# Patient Record
Sex: MALE | Race: OTHER
[De-identification: names, ages, dates, MRNs, and addresses within clinical notes are randomized per-mention and may not be internally consistent; named-entity substitution may affect disease eponyms.]

---

## 2017-10-05 ENCOUNTER — HOSPITAL ENCOUNTER (OUTPATIENT)
Dept: HOSPITAL 62 - SC | Age: 47
Discharge: HOME | End: 2017-10-05
Attending: OPHTHALMOLOGY
Payer: COMMERCIAL

## 2017-10-05 DIAGNOSIS — H25.12: ICD-10-CM

## 2017-10-05 DIAGNOSIS — H25.041: Primary | ICD-10-CM

## 2017-10-05 PROCEDURE — 66984 XCAPSL CTRC RMVL W/O ECP: CPT

## 2017-10-05 PROCEDURE — V2632 POST CHMBR INTRAOCULAR LENS: HCPCS

## 2017-10-05 PROCEDURE — 08RJ3JZ REPLACEMENT OF RIGHT LENS WITH SYNTHETIC SUBSTITUTE, PERCUTANEOUS APPROACH: ICD-10-PCS | Performed by: OPHTHALMOLOGY

## 2017-10-05 RX ADMIN — BESIFLOXACIN PRN DROP: 6 SUSPENSION OPHTHALMIC at 07:04

## 2017-10-05 RX ADMIN — CYCLOPENTOLATE HYDROCHLORIDE AND PHENYLEPHRINE HYDROCHLORIDE PRN DROP: 2; 10 SOLUTION/ DROPS OPHTHALMIC at 07:04

## 2017-10-05 RX ADMIN — TROPICAMIDE PRN DROP: 10 SOLUTION/ DROPS OPHTHALMIC at 07:14

## 2017-10-05 RX ADMIN — TETRACAINE HYDROCHLORIDE PRN DROP: 5 SOLUTION OPHTHALMIC at 06:55

## 2017-10-05 RX ADMIN — TETRACAINE HYDROCHLORIDE PRN DROP: 5 SOLUTION OPHTHALMIC at 07:40

## 2017-10-05 RX ADMIN — TROPICAMIDE PRN DROP: 10 SOLUTION/ DROPS OPHTHALMIC at 07:04

## 2017-10-05 RX ADMIN — BESIFLOXACIN PRN DROP: 6 SUSPENSION OPHTHALMIC at 08:03

## 2017-10-05 RX ADMIN — BESIFLOXACIN PRN DROP: 6 SUSPENSION OPHTHALMIC at 06:54

## 2017-10-05 RX ADMIN — CYCLOPENTOLATE HYDROCHLORIDE AND PHENYLEPHRINE HYDROCHLORIDE PRN DROP: 2; 10 SOLUTION/ DROPS OPHTHALMIC at 06:54

## 2017-10-05 RX ADMIN — TROPICAMIDE PRN DROP: 10 SOLUTION/ DROPS OPHTHALMIC at 06:54

## 2017-10-05 RX ADMIN — TETRACAINE HYDROCHLORIDE PRN DROP: 5 SOLUTION OPHTHALMIC at 07:14

## 2017-10-05 RX ADMIN — CYCLOPENTOLATE HYDROCHLORIDE AND PHENYLEPHRINE HYDROCHLORIDE PRN DROP: 2; 10 SOLUTION/ DROPS OPHTHALMIC at 07:14

## 2017-10-05 NOTE — [facility] OPERATIVE REPORT E
[facility] Operative Report



NAME: CASSIDY TATE

                                      MRN: [id number]

                             AGE: 47Y

DATE OF SURGERY: 10/05/2017           ROOM:



PREOPERATIVE DIAGNOSIS:

CATARACT, RIGHT EYE.



POSTOPERATIVE DIAGNOSIS:

CATARACT, RIGHT EYE.



OPERATION:

Cataract extraction with intraocular lens implant of the right eye.



SURGEON:

RAHUL LIEBERMAN M.D.



ANESTHESIA:

Topical.



PROCEDURE:

After obtaining appropriate consent, the patient's right eye was prepped

and draped in sterile fashion as well as the surgeon in a sterile manner

and cataract surgery was started.  First a paracentesis blade was used to

make a small side-port incision.  Viscoelastic was used to inflate the

anterior chamber.  Next a 2.4 mm incision was made with the paracentesis

blade.  A continuous capsulorrhexis incision was made using a cystotome and

Utrata forceps.  Following this hydrodissection was carried out to make the

lens fully loose and mobile and it was rotated 90 degrees.  Following this,

a divide-and-conquer technique was used to phacoemulsify the lens with a

CDE of 8.77. The remaining cortex was removed with irrigation/aspiration. 

Provisc was instilled into the capsular bag to inflate the bag.  A SN60WF,

18.5 diopter lens was placed.  The remaining viscoelastic material was

removed with irrigation/aspiration.  Following this, a 10-0 nylon suture

was used to close the incision and it was found to be watertight.  Vigamox

was instilled in the eye and a protective shield was placed over the eye. 

The patient returned to the postoperative recovery in stable condition.









DICTATING PHYSICIAN: RAHUL LIEBERMAN M.D.



[id number]              DT: 10/05/2017 2025

PHY#: 2011         DD: 10/05/2017 2015

ID:   [id number]               JOB#: [id number]       ACCT: [id number]



cc:RAHUL LIEBERMAN M.D.

>
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
## 2020-09-11 ENCOUNTER — HOSPITAL ENCOUNTER (INPATIENT)
Dept: HOSPITAL 62 - ER | Age: 50
LOS: 8 days | DRG: 208 | End: 2020-09-19
Attending: HOSPITALIST | Admitting: HOSPITALIST
Payer: COMMERCIAL

## 2020-09-11 DIAGNOSIS — J12.89: ICD-10-CM

## 2020-09-11 DIAGNOSIS — R65.20: ICD-10-CM

## 2020-09-11 DIAGNOSIS — N17.0: ICD-10-CM

## 2020-09-11 DIAGNOSIS — E66.9: ICD-10-CM

## 2020-09-11 DIAGNOSIS — U07.1: Primary | ICD-10-CM

## 2020-09-11 DIAGNOSIS — J96.01: ICD-10-CM

## 2020-09-11 DIAGNOSIS — A41.9: ICD-10-CM

## 2020-09-11 DIAGNOSIS — I46.9: ICD-10-CM

## 2020-09-11 DIAGNOSIS — R74.0: ICD-10-CM

## 2020-09-11 DIAGNOSIS — E87.5: ICD-10-CM

## 2020-09-11 LAB
ADD MANUAL DIFF: NO
ALBUMIN SERPL-MCNC: 2.9 G/DL (ref 3.5–5)
ALP SERPL-CCNC: 369 U/L (ref 38–126)
ANION GAP SERPL CALC-SCNC: 8 MMOL/L (ref 5–19)
APAP SERPL-MCNC: < 10 UG/ML (ref 10–30)
AST SERPL-CCNC: 416 U/L (ref 17–59)
BASE EXCESS BLDV CALC-SCNC: 2.7 MMOL/L
BASOPHILS # BLD AUTO: 0 10^3/UL (ref 0–0.2)
BASOPHILS NFR BLD AUTO: 0.1 % (ref 0–2)
BILIRUB DIRECT SERPL-MCNC: 1.8 MG/DL (ref 0–0.4)
BILIRUB SERPL-MCNC: 2.4 MG/DL (ref 0.2–1.3)
BUN SERPL-MCNC: 9 MG/DL (ref 7–20)
CALCIUM: 7.9 MG/DL (ref 8.4–10.2)
CHLORIDE SERPL-SCNC: 102 MMOL/L (ref 98–107)
CK MB SERPL-MCNC: 1.07 NG/ML (ref ?–4.55)
CK SERPL-CCNC: 165 U/L (ref 55–170)
CO2 SERPL-SCNC: 26 MMOL/L (ref 22–30)
EOSINOPHIL # BLD AUTO: 0 10^3/UL (ref 0–0.6)
EOSINOPHIL NFR BLD AUTO: 0 % (ref 0–6)
ERYTHROCYTE [DISTWIDTH] IN BLOOD BY AUTOMATED COUNT: 14.6 % (ref 11.5–14)
FERRITIN SERPL-MCNC: 1600 NG/ML (ref 17.9–464)
GLUCOSE SERPL-MCNC: 125 MG/DL (ref 75–110)
HCO3 BLDV-SCNC: 28.4 MMOL/L (ref 20–32)
HCT VFR BLD CALC: 43.2 % (ref 37.9–51)
HGB BLD-MCNC: 14.9 G/DL (ref 13.5–17)
LYMPHOCYTES # BLD AUTO: 0.9 10^3/UL (ref 0.5–4.7)
LYMPHOCYTES NFR BLD AUTO: 7.7 % (ref 13–45)
MCH RBC QN AUTO: 34.4 PG (ref 27–33.4)
MCHC RBC AUTO-ENTMCNC: 34.4 G/DL (ref 32–36)
MCV RBC AUTO: 100 FL (ref 80–97)
MONOCYTES # BLD AUTO: 0.3 10^3/UL (ref 0.1–1.4)
MONOCYTES NFR BLD AUTO: 2.5 % (ref 3–13)
NEUTROPHILS # BLD AUTO: 10.5 10^3/UL (ref 1.7–8.2)
NEUTS SEG NFR BLD AUTO: 89.7 % (ref 42–78)
PCO2 BLDV: 47.5 MMHG (ref 35–63)
PH BLDV: 7.4 [PH] (ref 7.3–7.42)
PLATELET # BLD: 102 10^3/UL (ref 150–450)
POTASSIUM SERPL-SCNC: 3.7 MMOL/L (ref 3.6–5)
PROT SERPL-MCNC: 7.7 G/DL (ref 6.3–8.2)
RBC # BLD AUTO: 4.33 10^6/UL (ref 4.35–5.55)
TOTAL CELLS COUNTED % (AUTO): 100 %
TROPONIN I SERPL-MCNC: < 0.012 NG/ML
WBC # BLD AUTO: 11.7 10^3/UL (ref 4–10.5)

## 2020-09-11 PROCEDURE — 82550 ASSAY OF CK (CPK): CPT

## 2020-09-11 PROCEDURE — 82728 ASSAY OF FERRITIN: CPT

## 2020-09-11 PROCEDURE — C9113 INJ PANTOPRAZOLE SODIUM, VIA: HCPCS

## 2020-09-11 PROCEDURE — 96375 TX/PRO/DX INJ NEW DRUG ADDON: CPT

## 2020-09-11 PROCEDURE — 86140 C-REACTIVE PROTEIN: CPT

## 2020-09-11 PROCEDURE — 87340 HEPATITIS B SURFACE AG IA: CPT

## 2020-09-11 PROCEDURE — 83605 ASSAY OF LACTIC ACID: CPT

## 2020-09-11 PROCEDURE — 96365 THER/PROPH/DIAG IV INF INIT: CPT

## 2020-09-11 PROCEDURE — 82947 ASSAY GLUCOSE BLOOD QUANT: CPT

## 2020-09-11 PROCEDURE — 85379 FIBRIN DEGRADATION QUANT: CPT

## 2020-09-11 PROCEDURE — 83735 ASSAY OF MAGNESIUM: CPT

## 2020-09-11 PROCEDURE — 99285 EMERGENCY DEPT VISIT HI MDM: CPT

## 2020-09-11 PROCEDURE — 87070 CULTURE OTHR SPECIMN AEROBIC: CPT

## 2020-09-11 PROCEDURE — 80048 BASIC METABOLIC PNL TOTAL CA: CPT

## 2020-09-11 PROCEDURE — 36556 INSERT NON-TUNNEL CV CATH: CPT

## 2020-09-11 PROCEDURE — 36600 WITHDRAWAL OF ARTERIAL BLOOD: CPT

## 2020-09-11 PROCEDURE — 83615 LACTATE (LD) (LDH) ENZYME: CPT

## 2020-09-11 PROCEDURE — 74018 RADEX ABDOMEN 1 VIEW: CPT

## 2020-09-11 PROCEDURE — 94640 AIRWAY INHALATION TREATMENT: CPT

## 2020-09-11 PROCEDURE — 82330 ASSAY OF CALCIUM: CPT

## 2020-09-11 PROCEDURE — 99291 CRITICAL CARE FIRST HOUR: CPT

## 2020-09-11 PROCEDURE — 87880 STREP A ASSAY W/OPTIC: CPT

## 2020-09-11 PROCEDURE — 82803 BLOOD GASES ANY COMBINATION: CPT

## 2020-09-11 PROCEDURE — 92950 HEART/LUNG RESUSCITATION CPR: CPT

## 2020-09-11 PROCEDURE — 76705 ECHO EXAM OF ABDOMEN: CPT

## 2020-09-11 PROCEDURE — 80053 COMPREHEN METABOLIC PANEL: CPT

## 2020-09-11 PROCEDURE — 96361 HYDRATE IV INFUSION ADD-ON: CPT

## 2020-09-11 PROCEDURE — C9803 HOPD COVID-19 SPEC COLLECT: HCPCS

## 2020-09-11 PROCEDURE — 74177 CT ABD & PELVIS W/CONTRAST: CPT

## 2020-09-11 PROCEDURE — 36415 COLL VENOUS BLD VENIPUNCTURE: CPT

## 2020-09-11 PROCEDURE — 93010 ELECTROCARDIOGRAM REPORT: CPT

## 2020-09-11 PROCEDURE — 71045 X-RAY EXAM CHEST 1 VIEW: CPT

## 2020-09-11 PROCEDURE — 94660 CPAP INITIATION&MGMT: CPT

## 2020-09-11 PROCEDURE — 85025 COMPLETE CBC W/AUTO DIFF WBC: CPT

## 2020-09-11 PROCEDURE — 82962 GLUCOSE BLOOD TEST: CPT

## 2020-09-11 PROCEDURE — 80074 ACUTE HEPATITIS PANEL: CPT

## 2020-09-11 PROCEDURE — 80307 DRUG TEST PRSMV CHEM ANLYZR: CPT

## 2020-09-11 PROCEDURE — 86900 BLOOD TYPING SEROLOGIC ABO: CPT

## 2020-09-11 PROCEDURE — 87635 SARS-COV-2 COVID-19 AMP PRB: CPT

## 2020-09-11 PROCEDURE — 82553 CREATINE MB FRACTION: CPT

## 2020-09-11 PROCEDURE — 71275 CT ANGIOGRAPHY CHEST: CPT

## 2020-09-11 PROCEDURE — 86317 IMMUNOASSAY INFECTIOUS AGENT: CPT

## 2020-09-11 PROCEDURE — 93005 ELECTROCARDIOGRAM TRACING: CPT

## 2020-09-11 PROCEDURE — 31500 INSERT EMERGENCY AIRWAY: CPT

## 2020-09-11 PROCEDURE — P9047 ALBUMIN (HUMAN), 25%, 50ML: HCPCS

## 2020-09-11 PROCEDURE — 94003 VENT MGMT INPAT SUBQ DAY: CPT

## 2020-09-11 PROCEDURE — 86901 BLOOD TYPING SEROLOGIC RH(D): CPT

## 2020-09-11 PROCEDURE — 85652 RBC SED RATE AUTOMATED: CPT

## 2020-09-11 PROCEDURE — 84484 ASSAY OF TROPONIN QUANT: CPT

## 2020-09-11 PROCEDURE — 87040 BLOOD CULTURE FOR BACTERIA: CPT

## 2020-09-11 PROCEDURE — 94002 VENT MGMT INPAT INIT DAY: CPT

## 2020-09-11 PROCEDURE — 36430 TRANSFUSION BLD/BLD COMPNT: CPT

## 2020-09-11 PROCEDURE — 83690 ASSAY OF LIPASE: CPT

## 2020-09-11 PROCEDURE — 84478 ASSAY OF TRIGLYCERIDES: CPT

## 2020-09-11 RX ADMIN — DEXAMETHASONE SODIUM PHOSPHATE SCH MG: 10 INJECTION INTRAMUSCULAR; INTRAVENOUS at 17:00

## 2020-09-11 RX ADMIN — AZITHROMYCIN MONOHYDRATE SCH MLS/HR: 500 INJECTION, POWDER, LYOPHILIZED, FOR SOLUTION INTRAVENOUS at 17:00

## 2020-09-11 RX ADMIN — SODIUM CHLORIDE PRN MLS/HR: 9 INJECTION, SOLUTION INTRAVENOUS at 22:29

## 2020-09-11 NOTE — RADIOLOGY REPORT (SQ)
EXAM DESCRIPTION:  CTA CHEST



IMAGES COMPLETED DATE/TIME:  9/11/2020 12:31 pm



REASON FOR STUDY:  sobr/ddimer elev/bilat infiltrates/covid suspicion



COMPARISON:  None.



TECHNIQUE:  CT scan of the chest performed using helical scanning technique with dynamic intravenous 
contrast injection.  Images reviewed with lung, soft tissue and bone windows.  Reconstructed coronal 
and sagittal MPR images reviewed.

Additional 3 dimensional post-processing performed to develop Maximal Intensity Projection images (MI
P).  All images stored on PACS.

All CT scanners at this facility use dose modulation, iterative reconstruction, and/or weight based d
osing when appropriate to reduce radiation dose to as low as reasonably achievable (ALARA).

[facility]: Dose Right  [facility]: CareDose    [facility]: Dose Right    [facility]: Teradose 4D    [facility]: Smart Technologies



CONTRAST TYPE AND DOSE:  100 ml Omnipaque 350

contrast bolus optimized for the pulmonary arteries. Not diagnostic for the aorta.



RENAL FUNCTION:  Creatinine - 0.67

BUN=9



RADIATION DOSE:  CT Rad equipment meets quality standard of care and radiation dose reduction techniq
ues were employed. CTDIvol: NaN - NaN mGy. DLP: 0 mGy-cm. .



LIMITATIONS:  None.



FINDINGS:  LUNGS AND PLEURA:  Bilateral fairly multifocal extensive peripheral and parenchymal ground
-glass opacities/consolidation and areas of consolidation in the lower lobes bilaterally with associa
ketan air bronchograms.  No pneumothorax.  Very small left pleural effusion.  The central airways are c
lear.

AORTA AND GREAT VESSELS:  Great vessels are patent.  No aneurysm.  Contrast bolus not optimized for t
he aorta.

HEART: No pericardial effusion. No significant coronary artery calcifications.

PULMONARY ARTERIES: No emboli visualized in the main pulmonary arteries or the segmental branches.

HILAR AND MEDIASTINAL STRUCTURES:  Prominent mediastinal and right hilar adenopathy, one of the large
st lymph nodes is noted in the right paratracheal region measures 2.7 in AP diameter.

HARDWARE: None in the chest.

UPPER ABDOMEN:  Please see CT abdomen report.

THYROID AND OTHER SOFT TISSUES:  The visualized thyroid gland is heterogenous in appearance with smal
l nodules present.

BONES: No acute or significant finding.

3D MIPS: Confirm above findings.

OTHER: No other significant finding.



IMPRESSION:  1.  No evidence for acute pulmonary emboli.

2.  Extensive bilateral multifocal peripheral and parenchymal ground-glass opacities/consolidation an
d areas of consolidation in the lower lobes bilaterally.  Commonly reported imaging features of COVID
-19 pneumonia are present.

3.  Mediastinal and right hilar lymphadenopathy.

3.  Additional findings as above.



COMMENT:  1.  The results of this examination were discussed with the emergency department provider o
n 9/11/2020 at 12:46 hours.

Quality ID # 436: Final reports with documentation of one or more dose reduction techniques (e.g., Au
tomated exposure control, adjustment of the mA and/or kV according to patient size, use of iterative 
reconstruction technique)



TECHNICAL DOCUMENTATION:  JOB ID:  [id number]

 2011 [employer]- All Rights Reserved



Reading location - IP/workstation name: [email or web address]

## 2020-09-11 NOTE — ER DOCUMENT REPORT
Entered by WAYNE SHORE SCRIBE  09/11/20 0908 





Acting as scribe for:MARIELLA GRIFFITHS MD





ED Respiratory Problem





- General


Chief Complaint: Breathing Difficulty


Stated Complaint: CHEST PAIN/DIFFICULTY BREATHING


Mode of Arrival: Wheelchair


Information source: Patient


Notes: 





This 50 year old male patient with no significant past medical history presents 

to the ED today with complaints of worsening shortness of breath and 

nonproductive cough that started x3 days ago. Patient states that he he noticed 

blood along with mucus out of his right nare after blowing his nose and that he 

is unsure of whether or not he had a fever. He also notes abdominal pain due to 

coughing. Denies any chest pain, headache, or sick contacts. He mentions that he

works alone at a [employer]. He does not take any medications or have any 

allergies.





TRAVEL OUTSIDE OF THE [place] IN LAST 30 DAYS: Yes





- Related Data


Allergies/Adverse Reactions: 


                                        





No Known Allergies Allergy (Verified 10/05/17 06:50)


   











Past Medical History





- General


Information source: Patient





- Social History


Smoking Status: Never Smoker


Cigarette use (# per day): No


Chew tobacco use (# tins/day): No


Smoking Education Provided: No


Frequency of alcohol use: None


Drug Abuse: None


Family History: Reviewed & Not Pertinent


Patient has suicidal ideation: No


Patient has homicidal ideation: No





- Medical History


Medical History: Negative


Surgical Hx: Negative





Review of Systems





- Review of Systems


Constitutional: See HPI


EENT: See HPI, Nose discharge


Cardiovascular: See HPI.  denies: Chest pain


Respiratory: See HPI, Cough, Short of breath.  denies: Sputum


Gastrointestinal: See HPI, Abdominal pain


Genitourinary: No symptoms reported


Male Genitourinary: No symptoms reported


Musculoskeletal: No symptoms reported


Skin: No symptoms reported


Hematologic/Lymphatic: No symptoms reported


Neurological/Psychological: See HPI.  denies: Headaches


-: Yes All other systems reviewed and negative





Physical Exam





- Vital signs


Vitals: 


                                        











Temp Pulse Resp BP Pulse Ox


 


 99.6 F   98   20   143/74 H  88 L


 


 09/11/20 07:59  09/11/20 07:59  09/11/20 07:59  09/11/20 07:59  09/11/20 07:59














- General


General appearance: Alert


In distress: None





- HEENT


Head: Normocephalic, Atraumatic


Eyes: Normal


Extraocular movements intact: Yes


Pupils: PERRL


Pharynx: Erythema - mild


Neck: Supple





- Respiratory


Chest status: Nontender


Breath sounds: Decreased air movement - Diminished breath sound in bilateral 

bases, Rales - right base


Chest palpation: Normal


Notes: 





When patient was placed in the room, he was tachypneic and hypoxic, 88% on RA. 

Once placed on supplemental oxygen, sats came up to 95%.





- Cardiovascular


Rhythm: Regular.  No: Tachycardia


Heart sounds: Normal auscultation


Murmur: No


Friction rub: No


Gallop: None auscultated





- Abdominal


Inspection: Obese


Distension: No distension


Bowel sounds: Normal


Tenderness: Nontender - Abdomen soft, no point tenderness


Organomegaly: No organomegaly





- Back


Back: Normal, Nontender





- Extremities


General upper extremity: Normal inspection


General lower extremity: Normal inspection.  No: Edema





- Neurological


Neuro grossly intact: Yes


Orientation: AAOx4


[place] Coma Scale Eye Opening: Spontaneous


Kv[place] Coma Scale Verbal: Oriented


[place] Coma Scale Motor: Obeys Commands


[place] Coma Scale Total: 15





- Psychological


Associated symptoms: Normal affect, Normal mood





- Skin


Skin Temperature: Warm


Skin Moisture: Dry


Skin Color: Normal





Course





- Re-evaluation


Re-evalutation: 





09/13/20 07:17


Patient resting comfortably on oxygen to maintain normal O2 sats.





- Vital Signs


Vital signs: 


                                        











Temp Pulse Resp BP Pulse Ox


 


 96.9 F L  62   28 H  138/82 H  97 


 


 09/13/20 05:32  09/13/20 05:32  09/13/20 05:32  09/13/20 05:32  09/13/20 05:32











09/13/20 07:18


Vital signs stable pulse oximetry 97%





- Laboratory


Result Diagrams: 


                                 09/12/20 05:20





                                 09/12/20 05:20


Laboratory results interpreted by me: 


                                        











  09/11/20 09/11/20 09/11/20





  08:23 08:23 08:23


 


WBC  11.7 H  


 


RBC  4.33 L  


 


MCV  100 H  


 


MCH  34.4 H  


 


RDW  14.6 H  


 


Plt Count  102 L  


 


Lymph % (Auto)  7.7 L  


 


Mono % (Auto)  2.5 L  


 


Absolute Neuts (auto)  10.5 H  


 


Seg Neutrophils %  89.7 H  


 


D-Dimer   


 


Sodium   135.5 L 


 


Glucose   125 H 


 


Lactic Acid    2.5 H


 


Calcium   7.9 L 


 


Ferritin   


 


Total Bilirubin   2.4 H 


 


Direct Bilirubin   1.8 H 


 


AST   416 H 


 


ALT   155 H 


 


Alkaline Phosphatase   369 H 


 


Lactate Dehydrogenase   


 


Albumin   2.9 L 


 


Acetaminophen   


 


COVID-19 ([place])   














  09/11/20 09/11/20 09/11/20





  08:23 08:23 08:23


 


WBC   


 


RBC   


 


MCV   


 


MCH   


 


RDW   


 


Plt Count   


 


Lymph % (Auto)   


 


Mono % (Auto)   


 


Absolute Neuts (auto)   


 


Seg Neutrophils %   


 


D-Dimer  1.29 H  


 


Sodium   


 


Glucose   


 


Lactic Acid   


 


Calcium   


 


Ferritin   1600.00 H 


 


Total Bilirubin   


 


Direct Bilirubin   


 


AST   


 


ALT   


 


Alkaline Phosphatase   


 


Lactate Dehydrogenase    408 H


 


Albumin   


 


Acetaminophen   < 10 L 


 


COVID-19 ([place])   














  09/11/20





  09:10


 


WBC 


 


RBC 


 


MCV 


 


MCH 


 


RDW 


 


Plt Count 


 


Lymph % (Auto) 


 


Mono % (Auto) 


 


Absolute Neuts (auto) 


 


Seg Neutrophils % 


 


D-Dimer 


 


Sodium 


 


Glucose 


 


Lactic Acid 


 


Calcium 


 


Ferritin 


 


Total Bilirubin 


 


Direct Bilirubin 


 


AST 


 


ALT 


 


Alkaline Phosphatase 


 


Lactate Dehydrogenase 


 


Albumin 


 


Acetaminophen 


 


COVID-19 ([place])  DETECTED H











09/13/20 07:18


Laboratories disclose mildly elevated white blood cell count 11.7 in an elevated

ferritin elevated LDH lactate dehydrogenase.





- Diagnostic Test


Radiology reviewed: Image reviewed, Reports reviewed


Radiology results interpreted by me: 





                                        





Chest X-Ray  09/11/20 08:31


IMPRESSION:  Multi lobar pneumonia.  Given distribution, recommend consideration

for atypical etiologies in treatment planning.


 








Abdomen Ultrasound  09/11/20 09:16


IMPRESSION:  No evidence of cholelithiasis or cholecystitis.  Nonvisualization 

of the pancreas and abdominal aorta due to obscuration by intervening bowel gas.

 Otherwise unremarkable examination.


 








Chest/Abdomen CTA  09/11/20 11:25


IMPRESSION:  1.  No evidence for acute pulmonary emboli.


2.  Extensive bilateral multifocal peripheral and parenchymal ground-glass 

opacities/consolidation and areas of consolidation in the lower lobes 

bilaterally.  Commonly reported imaging features of COVID-19 pneumonia are 

present.


3.  Mediastinal and right hilar lymphadenopathy.


3.  Additional findings as above.


 








Abdomen/Pelvis CT  09/11/20 11:26


IMPRESSION:  1.  Hepatic steatosis.


2.  Prominence of the gallbladder wall.  No gallstones.


3.  Small uncomplicated inguinal hernias.


 











09/13/20 07:27


Radiology reports show that there is multi lobar pneumonia with groundglass 

opacities and consolidation.  Also no evidence for pulmonary embolus on CT 

angiogram.  Abdomen and pelvis CT did show a prominent gallbladder without any 

other significant findings of infection or inflammation about the biliary tree 

system.  Abdominal ultrasound also disclose no evidence for cholelithiasis or 

cholecystitis.





- EKG Interpretation by Me


Additional EKG results interpreted by me: 





09/13/20 07:28


Twelve-lead EKG shows normal sinus rhythm rate of 94 probable left atrial 

abnormality otherwise normal axis normal intervals IN interval QRS and QT.  No 

signs of acute ST elevations.





Discharge





- Discharge


Clinical Impression: 


 Bilateral pneumonia, Hypoxia, Suspected COVID-19 virus infection





Condition: Critical


Disposition: ADMITTED AS INPATIENT


Admitting Provider: Bessie (Hospitalist)


Unit Admitted: [facility]





I personally performed the services described in the documentation, reviewed and

edited the documentation which was dictated to the scribe in my presence, and it

accurately records my words and actions.

## 2020-09-11 NOTE — RADIOLOGY REPORT (SQ)
EXAM DESCRIPTION:  CT ABD/PELVIS WITH IV ONLY



IMAGES COMPLETED DATE/TIME:  9/11/2020 12:32 pm



REASON FOR STUDY:  elevated LFTs/abd pain



COMPARISON:  None.



TECHNIQUE:  CT scan of the abdomen and pelvis performed using helical scanning technique with dynamic
 intravenous contrast injection.  No oral contrast. Images reviewed with lung, soft tissue, and bone 
windows. Reconstructed coronal and sagittal MPR images reviewed. Delayed images for evaluation of the
 urinary system also acquired. All images stored on PACS.

All CT scanners at this facility use dose modulation, iterative reconstruction, and/or weight based d
osing when appropriate to reduce radiation dose to as low as reasonably achievable (ALARA).

[facility]: Dose Right  [facility]: CareDose    [facility]: Dose Right    [facility]: Teradose 4D    [facility]: [employer]



CONTRAST TYPE AND DOSE:  contrast/concentration: Isovue 350.00 mmol/ml; Total Contrast Delivered: 100
.0 ml; Total Saline Delivered: 90.0 ml



RENAL FUNCTION:  BUN 9 creatinine 0.67



RADIATION DOSE:  .



LIMITATIONS:  None.



FINDINGS:  LOWER CHEST: See separate report of the CT of the chest.

LIVER: The liver is diffusely hypoattenuating.  No hepatic mass is seen.

SPLEEN: Normal size. No focal lesions.

PANCREAS: No masses. No significant calcifications. No adjacent inflammation or peripancreatic fluid 
collections. Pancreatic duct not dilated.

GALLBLADDER: There appears to be mild prominence of the gallbladder wall.  No gallstones are seen.

ADRENAL GLANDS: No significant masses or asymmetry.

RIGHT KIDNEY AND URETER: No solid masses.   No significant calcifications.   No hydronephrosis or hyd
roureter.

LEFT KIDNEY AND URETER: No solid masses.   No significant calcifications.   No hydronephrosis or hydr
oureter.

AORTA AND VESSELS: No aneurysm. No dissection. Renal arteries, SMA, celiac without stenosis.

RETROPERITONEUM: No retroperitoneal adenopathy, hemorrhage or masses.

BOWEL AND PERITONEAL CAVITY: No masses or inflammatory changes. No free fluid or peritoneal masses.

APPENDIX: Normal.

PELVIS: No mass.  No free fluid. Normal bladder.

ABDOMINAL WALL: Bilateral small uncomplicated inguinal hernias are present.

BONES: No significant or acute findings.

OTHER: No other significant finding.



IMPRESSION:  1.  Hepatic steatosis.

2.  Prominence of the gallbladder wall.  No gallstones.

3.  Small uncomplicated inguinal hernias.



TECHNICAL DOCUMENTATION:  JOB ID:  [id number]

Quality ID # 436: Final reports with documentation of one or more dose reduction techniques (e.g., Au
tomated exposure control, adjustment of the mA and/or kV according to patient size, use of iterative 
reconstruction technique)

 2011 [employer]- All Rights Reserved



Reading location - IP/workstation name: [email or web address]

## 2020-09-11 NOTE — RADIOLOGY REPORT (SQ)
EXAM DESCRIPTION:  U/S ABDOMEN LIMITED W/O DOP



IMAGES COMPLETED DATE/TIME:  9/11/2020 11:28 am



REASON FOR STUDY:  elevated LFTs



COMPARISON:  None.



TECHNIQUE:  Dynamic and static grayscale images acquired of the abdomen and recorded on PACS. Additio
nal selected color Doppler and spectral images recorded.



LIMITATIONS:  None.



FINDINGS:  PANCREAS: Largely obscured by intervening bowel gas.

LIVER: No masses. Echotexture normal.

LIVER VASCULATURE: Normal directional flow of the main portal vein and hepatic veins.

GALLBLADDER: No stones. Normal wall thickness. No pericholecystic fluid.

ULTRASOUND-DETECTED GAY'S SIGN: Negative.

INTRAHEPATIC DUCTS AND COMMON DUCT: CBD and intrahepatic ducts normal caliber. No filling defects.

INFERIOR VENA CAVA: Normal flow.

AORTA: Largely obscured by intervening bowel gas.

RIGHT KIDNEY:  Normal size. Normal echogenicity. No solid or suspicious masses. No hydronephrosis. No
 calcifications.

PERITONEAL AND RIGHT PLEURAL SPACE: No ascites or effusions.

OTHER: No other significant findings.



IMPRESSION:  No evidence of cholelithiasis or cholecystitis.  Nonvisualization of the pancreas and ab
dominal aorta due to obscuration by intervening bowel gas.  Otherwise unremarkable examination.



TECHNICAL DOCUMENTATION:  JOB ID:  [id number]

 2011 [employer]- All Rights Reserved



Reading location - IP/workstation name: [email or web address]

## 2020-09-11 NOTE — EKG REPORT
SEVERITY:- BORDERLINE ECG -

SINUS RHYTHM

PROBABLE LEFT ATRIAL ABNORMALITY

:

Confirmed by: Rick Tyler MD 11-Sep-2020 16:55:05

## 2020-09-11 NOTE — PDOC H&P
History of Present Illness


Admission Date/PCP: 


  09/11/20 14:17





  





Patient complains of: 50-year-old patient presents to the ED with complaints of 

worsening shortness of breath, cough associated with blood which started about 3

days ago.


History of Present Illness: 


CASSIDY TATE is a 50 year old male








50-year-old patient presents to the ED with complaints of worsening shortness of

breath, cough associated with blood which started about 3 days ago.  Also 

complains of abdominal pain due to coughing.  He works at a [employer].  Was 

tachypneic and hypoxic initially with a saturation of 88% which improved with 

supplemental oxygen


CT scan of the chest shows extensive bilateral multifocal peripheral and 

parenchymal groundglass opacitiesconsolidation and areas of consolidation in 

the lower lobes bilaterally.  Currently reported imaging features of COVID-19 

pneumonia are present.  There is also mediastinal and right hilar 

lymphadenopathy.  Chest x-ray shows multilobar pneumonia.  CT of the abdomen 

shows hepatic steatosis





Past Medical History


Cardiac Medical History: 


   Denies: Myocardial Infarction, Hypertension


Pulmonary Medical History: 


   Denies: Asthma


Neurological Medical History: 


   Denies: Seizures


GI Medical History: 


   Denies: Hepatitis, Hiatal Hernia


Hematology: 


   Denies: Anemia, Sickle Cell Disease





Past Surgical History


Past Surgical History: 


   Denies: Pacemaker





Social History


Information Source: Patient


Smoking Status: Never Smoker





- Advance Directive


Resuscitation Status: Full Code





Family History


Family History: Reviewed & Not Pertinent


Parental Family History Reviewed: No


Children Family History Reviewed: No


Sibling(s) Family History Reviewed.: No





Medication/Allergy


Allergies/Adverse Reactions: 


                                        





No Known Allergies Allergy (Verified 10/05/17 06:50)


   











Review of Systems


ROS unobtainable: Other - Due to respiratory status





Physical Exam


Vital Signs: 


                                        











Temp Pulse Resp BP Pulse Ox


 


 99.6 F   98   31 H  122/83   94 


 


 09/11/20 07:59  09/11/20 07:59  09/11/20 13:00  09/11/20 12:01  09/11/20 12:01








                                 Intake & Output











 09/10/20 09/11/20 09/12/20





 06:59 06:59 06:59


 


Intake Total   1300


 


Balance   1300


 


Weight   81.193 kg











General appearance: PRESENT: no acute distress, well-developed, well-nourished


Head exam: PRESENT: atraumatic, normocephalic


Eye exam: PRESENT: conjunctiva pink, EOMI, PERRLA.  ABSENT: scleral icterus


Ear exam: PRESENT: normal external ear exam


Mouth exam: PRESENT: moist, tongue midline


Neck exam: ABSENT: carotid bruit, JVD, lymphadenopathy, thyromegaly


Respiratory exam: PRESENT: clear to auscultation craig.  ABSENT: rales, rhonchi, 

wheezes


Cardiovascular exam: PRESENT: RRR.  ABSENT: diastolic murmur, rubs, systolic 

murmur


Pulses: PRESENT: normal dorsalis pedis pul


Vascular exam: PRESENT: normal capillary refill


GI/Abdominal exam: PRESENT: normal bowel sounds, soft.  ABSENT: distended, 

guarding, mass, organolmegaly, rebound, tenderness


Rectal exam: PRESENT: deferred


Extremities exam: PRESENT: full ROM.  ABSENT: calf tenderness, clubbing, pedal 

edema


Neurological exam: PRESENT: alert, awake, oriented to person, oriented to place,

oriented to time, oriented to situation, CN II-XII grossly intact.  ABSENT: 

motor sensory deficit


Psychiatric exam: PRESENT: appropriate affect, normal mood.  ABSENT: homicidal 

ideation, suicidal ideation


Skin exam: PRESENT: dry, intact, warm.  ABSENT: cyanosis, rash





Results


Laboratory Results: 


                                        





                                 09/11/20 08:23 





                                 09/11/20 08:23 





                                        











  09/11/20 09/11/20 09/11/20





  08:23 08:23 08:23


 


WBC  11.7 H  


 


RBC  4.33 L  


 


Hgb  14.9  


 


Hct  43.2  


 


MCV  100 H  


 


MCH  34.4 H  


 


MCHC  34.4  


 


RDW  14.6 H  


 


Plt Count  102 L  


 


Seg Neutrophils %  89.7 H  


 


VBG pH   


 


VBG pCO2   


 


VBG HCO3   


 


VBG Base Excess   


 


Sodium   135.5 L 


 


Potassium   3.7 


 


Chloride   102 


 


Carbon Dioxide   26 


 


Anion Gap   8 


 


BUN   9 


 


Creatinine   0.67 


 


Est GFR ( Amer)   > 60 


 


Glucose   125 H 


 


Lactic Acid    2.5 H


 


Calcium   7.9 L 


 


Ferritin   


 


Total Bilirubin   2.4 H 


 


AST   416 H 


 


Alkaline Phosphatase   369 H 


 


Total Protein   7.7 


 


Albumin   2.9 L 


 


Lipase   














  09/11/20 09/11/20 09/11/20





  08:23 08:23 08:23


 


WBC   


 


RBC   


 


Hgb   


 


Hct   


 


MCV   


 


MCH   


 


MCHC   


 


RDW   


 


Plt Count   


 


Seg Neutrophils %   


 


VBG pH  7.40  


 


VBG pCO2  47.5  


 


VBG HCO3  28.4  


 


VBG Base Excess  2.7  


 


Sodium   


 


Potassium   


 


Chloride   


 


Carbon Dioxide   


 


Anion Gap   


 


BUN   


 


Creatinine   


 


Est GFR (African Amer)   


 


Glucose   


 


Lactic Acid   


 


Calcium   


 


Ferritin   1600.00 H 


 


Total Bilirubin   


 


AST   


 


Alkaline Phosphatase   


 


Total Protein   


 


Albumin   


 


Lipase    196.1








                                        











  09/11/20 09/11/20





  08:23 08:23


 


Creatine Kinase  165 


 


CK-MB (CK-2)   1.07


 


Troponin I   < 0.012











Impressions: 


                                        





Chest X-Ray  09/11/20 08:31


IMPRESSION:  Multi lobar pneumonia.  Given distribution, recommend consideration

for atypical etiologies in treatment planning.


 








Abdomen Ultrasound  09/11/20 09:16


IMPRESSION:  No evidence of cholelithiasis or cholecystitis.  Nonvisualization 

of the pancreas and abdominal aorta due to obscuration by intervening bowel gas.

 Otherwise unremarkable examination.


 








Chest/Abdomen CTA  09/11/20 11:25


IMPRESSION:  1.  No evidence for acute pulmonary emboli.


2.  Extensive bilateral multifocal peripheral and parenchymal ground-glass 

opacities/consolidation and areas of consolidation in the lower lobes 

bilaterally.  Commonly reported imaging features of COVID-19 pneumonia are 

present.


3.  Mediastinal and right hilar lymphadenopathy.


3.  Additional findings as above.


 








Abdomen/Pelvis CT  09/11/20 11:26


IMPRESSION:  1.  Hepatic steatosis.


2.  Prominence of the gallbladder wall.  No gallstones.


3.  Small uncomplicated inguinal hernias.


 














Assessment and Plan





- Diagnosis


(1) Acute respiratory failure due to COVID-19


Is this a current diagnosis for this admission?: Yes   





(2) Transaminitis


Is this a current diagnosis for this admission?: Yes   





(3) Bilateral pneumonia


Is this a current diagnosis for this admission?: Yes   





(4) Hypoxia


Is this a current diagnosis for this admission?: Yes   





(5) Sepsis


Is this a current diagnosis for this admission?: Yes   





- Plan Summary


Summary: 


Patient presents to the hospital with acute respiratory failure.  He was found 

to be hypoxic with oxygen saturation of 88%, tachypneic with his respiratory 

rate as high as 37 as well as a transaminitis and bilateral lung opacities 

consistent with COVID-19.  Patient is septic secondary to COVID-19.  He received

cefepime in the emergency room however patient will be placed on Zithromax, 

dexamethasone, and supplements including zinc and vitamin D3.  I did contact 

pharmacy for initiating from this vi but at this time he has not met the 

hospitals criteria which include a positive COVID test and so we will follow-up 

with this subsequently.  Patient is also noted to have a transaminitis likely 

secondary to COVID





- Time


Time Spent with patient: 35 or more minutes


Medications reviewed and adjusted accordingly: Yes


Anticipated Discharge Disposition: Home, Self Care


Anticipated Discharge Timeframe: within 72 hours





- Inpatient Certification


I certify that my determination is in accordance with my understanding of 

Medicare's requirements for reasonable and necessary INPATIENT services [42 CFR 

412.3e].: Yes


Medical Necessity: Significant Comorbidiites Make Outpatient Treatment Too 

Risky, Need Close Monitoring Due to Risk of Patient Decompensation, Need for IV 

Antibiotics, Risk of Complication if Not Cared For in Hospital

## 2020-09-12 LAB
ADD MANUAL DIFF: NO
ALBUMIN SERPL-MCNC: 2.4 G/DL (ref 3.5–5)
ALP SERPL-CCNC: 330 U/L (ref 38–126)
ANION GAP SERPL CALC-SCNC: 5 MMOL/L (ref 5–19)
ARTERIAL BLOOD FIO2: (no result)
ARTERIAL BLOOD H2CO3: 1.16 MMOL/L (ref 1.05–1.35)
ARTERIAL BLOOD HCO3: 23.1 MMOL/L (ref 20–24)
ARTERIAL BLOOD PCO2: 38.4 MMHG (ref 35–45)
ARTERIAL BLOOD PH: 7.4 (ref 7.35–7.45)
ARTERIAL BLOOD PO2: 62.6 MMHG (ref 80–100)
ARTERIAL BLOOD TOTAL CO2: 24.3 MMOL/L (ref 23–27)
AST SERPL-CCNC: 257 U/L (ref 17–59)
BASE EXCESS BLDA CALC-SCNC: -1.3 MMOL/L
BASOPHILS # BLD AUTO: 0 10^3/UL (ref 0–0.2)
BASOPHILS NFR BLD AUTO: 0.2 % (ref 0–2)
BILIRUB DIRECT SERPL-MCNC: 1 MG/DL (ref 0–0.4)
BILIRUB SERPL-MCNC: 1.6 MG/DL (ref 0.2–1.3)
BUN SERPL-MCNC: 11 MG/DL (ref 7–20)
CALCIUM: 7.4 MG/DL (ref 8.4–10.2)
CHLORIDE SERPL-SCNC: 108 MMOL/L (ref 98–107)
CO2 SERPL-SCNC: 23 MMOL/L (ref 22–30)
EOSINOPHIL # BLD AUTO: 0 10^3/UL (ref 0–0.6)
EOSINOPHIL NFR BLD AUTO: 0 % (ref 0–6)
ERYTHROCYTE [DISTWIDTH] IN BLOOD BY AUTOMATED COUNT: 14.8 % (ref 11.5–14)
GLUCOSE SERPL-MCNC: 132 MG/DL (ref 75–110)
HCT VFR BLD CALC: 41.5 % (ref 37.9–51)
HGB BLD-MCNC: 14.6 G/DL (ref 13.5–17)
LYMPHOCYTES # BLD AUTO: 0.9 10^3/UL (ref 0.5–4.7)
LYMPHOCYTES NFR BLD AUTO: 16.5 % (ref 13–45)
MCH RBC QN AUTO: 34.7 PG (ref 27–33.4)
MCHC RBC AUTO-ENTMCNC: 35.2 G/DL (ref 32–36)
MCV RBC AUTO: 99 FL (ref 80–97)
MONOCYTES # BLD AUTO: 0.2 10^3/UL (ref 0.1–1.4)
MONOCYTES NFR BLD AUTO: 3.6 % (ref 3–13)
NEUTROPHILS # BLD AUTO: 4.4 10^3/UL (ref 1.7–8.2)
NEUTS SEG NFR BLD AUTO: 79.7 % (ref 42–78)
PLATELET # BLD: 92 10^3/UL (ref 150–450)
POTASSIUM SERPL-SCNC: 4.2 MMOL/L (ref 3.6–5)
PROT SERPL-MCNC: 6.7 G/DL (ref 6.3–8.2)
RBC # BLD AUTO: 4.21 10^6/UL (ref 4.35–5.55)
SAO2 % BLDA: 92.1 % (ref 94–98)
TOTAL CELLS COUNTED % (AUTO): 100 %
WBC # BLD AUTO: 5.5 10^3/UL (ref 4–10.5)

## 2020-09-12 RX ADMIN — ASPIRIN SCH: 81 TABLET, COATED ORAL at 21:13

## 2020-09-12 RX ADMIN — AZITHROMYCIN MONOHYDRATE SCH MLS/HR: 500 INJECTION, POWDER, LYOPHILIZED, FOR SOLUTION INTRAVENOUS at 11:46

## 2020-09-12 RX ADMIN — DEXAMETHASONE SODIUM PHOSPHATE SCH MG: 10 INJECTION INTRAMUSCULAR; INTRAVENOUS at 10:13

## 2020-09-12 RX ADMIN — SODIUM CHLORIDE PRN MLS/HR: 9 INJECTION, SOLUTION INTRAVENOUS at 19:40

## 2020-09-12 RX ADMIN — Medication SCH MG: at 10:12

## 2020-09-12 RX ADMIN — ENOXAPARIN SODIUM SCH: 40 INJECTION SUBCUTANEOUS at 21:13

## 2020-09-12 RX ADMIN — PANTOPRAZOLE SODIUM SCH MG: 40 TABLET, DELAYED RELEASE ORAL at 05:31

## 2020-09-12 RX ADMIN — SODIUM CHLORIDE PRN MLS/HR: 9 INJECTION, SOLUTION INTRAVENOUS at 08:53

## 2020-09-12 RX ADMIN — VITAMIN D, TAB 1000IU (100/BT) SCH UNIT: 25 TAB at 10:12

## 2020-09-12 RX ADMIN — ENOXAPARIN SODIUM SCH: 40 INJECTION SUBCUTANEOUS at 10:14

## 2020-09-12 NOTE — PDOC PROGRESS REPORT
Subjective


Progress Note for:: 09/12/20


Subjective:: 





Patient's breathing remains pretty tenuous.  He decompensates with attempts to 

decrease his oxygen.  When I saw him this morning he was on 10 L oxygen with 

facemask.  COVID-19 test is still pending


Reason For Visit: 


ATYPICAL PNEUMONIA, R/O COVID 19,ACUTE RESPIRATORY








Physical Exam


Vital Signs: 


                                        











Temp Pulse Resp BP Pulse Ox


 


 98.4 F   71   27 H  142/92 H  94 


 


 09/12/20 16:40  09/12/20 16:40  09/12/20 16:40  09/12/20 16:40  09/12/20 16:40








                                 Intake & Output











 09/11/20 09/12/20 09/13/20





 06:59 06:59 06:59


 


Intake Total  1800 1510


 


Output Total  1400 360


 


Balance  400 1150


 


Weight  84.6 kg 











General appearance: PRESENT: no acute distress


Head exam: PRESENT: atraumatic, normocephalic


Eye exam: PRESENT: PERRLA.  ABSENT: scleral icterus


Mouth exam: PRESENT: moist, tongue midline


Neck exam: ABSENT: carotid bruit, JVD, lymphadenopathy, thyromegaly


Respiratory exam: PRESENT: decreased breath sounds, rhonchi, tachypnea.  ABSENT:

rales, unlabored, wheezes


Cardiovascular exam: PRESENT: RRR.  ABSENT: diastolic murmur, rubs, systolic 

murmur


Pulses: PRESENT: normal dorsalis pedis pul


Vascular exam: PRESENT: normal capillary refill


GI/Abdominal exam: PRESENT: normal bowel sounds, soft.  ABSENT: distended, gu

arding, mass, organolmegaly, rebound, tenderness


Rectal exam: PRESENT: deferred


Extremities exam: ABSENT: calf tenderness, clubbing, pedal edema


Neurological exam: PRESENT: alert, awake, oriented to person, oriented to place,

oriented to time, oriented to situation, CN II-XII grossly intact.  ABSENT: 

motor sensory deficit


Psychiatric exam: PRESENT: appropriate affect, normal mood.  ABSENT: homicidal 

ideation, suicidal ideation


Skin exam: PRESENT: dry, intact, warm.  ABSENT: cyanosis, rash





Results


Laboratory Results: 


                                        





                                 09/12/20 05:20 





                                 09/12/20 05:20 





                                        











  09/12/20 09/12/20 09/12/20





  05:20 05:20 06:45


 


WBC  5.5  


 


RBC  4.21 L  


 


Hgb  14.6  


 


Hct  41.5  


 


MCV  99 H  


 


MCH  34.7 H  


 


MCHC  35.2  


 


RDW  14.8 H  


 


Plt Count  92 L  


 


Seg Neutrophils %  79.7 H  


 


Carbonic Acid    1.16


 


HCO3/H2CO3 Ratio    19:1


 


ABG pH    7.40


 


ABG pCO2    38.4


 


ABG pO2    62.6 L


 


ABG HCO3    23.1


 


ABG O2 Saturation    92.1 L


 


ABG Base Excess    -1.3


 


FiO2    10L


 


Sodium   135.8 L 


 


Potassium   4.2 


 


Chloride   108 H 


 


Carbon Dioxide   23 


 


Anion Gap   5 


 


BUN   11 


 


Creatinine   0.55 


 


Est GFR ( Amer)   > 60 


 


Glucose   132 H 


 


Calcium   7.4 L 


 


Total Bilirubin   1.6 H 


 


AST   257 H 


 


Alkaline Phosphatase   330 H 


 


Total Protein   6.7 


 


Albumin   2.4 L 








                                        











  09/11/20 09/11/20





  08:23 08:23


 


Creatine Kinase  165 


 


CK-MB (CK-2)   1.07


 


Troponin I   < 0.012











Impressions: 


                                        





Abdomen Ultrasound  09/11/20 09:16


IMPRESSION:  No evidence of cholelithiasis or cholecystitis.  Nonvisualization 

of the pancreas and abdominal aorta due to obscuration by intervening bowel gas.

 Otherwise unremarkable examination.


 








Chest/Abdomen CTA  09/11/20 11:25


IMPRESSION:  1.  No evidence for acute pulmonary emboli.


2.  Extensive bilateral multifocal peripheral and parenchymal ground-glass 

opacities/consolidation and areas of consolidation in the lower lobes 

bilaterally.  Commonly reported imaging features of COVID-19 pneumonia are p

resent.


3.  Mediastinal and right hilar lymphadenopathy.


3.  Additional findings as above.


 








Abdomen/Pelvis CT  09/11/20 11:26


IMPRESSION:  1.  Hepatic steatosis.


2.  Prominence of the gallbladder wall.  No gallstones.


3.  Small uncomplicated inguinal hernias.


 








Chest X-Ray  09/12/20 06:00


IMPRESSION:  No significant change.


 














Assessment and Plan





- Diagnosis


(1) Acute respiratory failure due to COVID-19


Is this a current diagnosis for this admission?: Yes   


Plan: 


COVID test is pending however clinically patient seems to have an acute 

respiratory illness secondary to COVID








(2) Transaminitis


Is this a current diagnosis for this admission?: Yes   


Plan: 


Slightly improved we will continue to monitor








(3) Bilateral pneumonia


Is this a current diagnosis for this admission?: Yes   


Plan: 


CT scan suggest imaging features of COVID-19, mediastinal and right hilar 

lymphadenopathy.  Follow-up chest x-ray today reveals no significant changes








(4) Hypoxia


Is this a current diagnosis for this admission?: Yes   





(5) Sepsis


Is this a current diagnosis for this admission?: Yes   


Plan: 


Follow-up labs are improved








- Plan Summary


Summary: 


Patient presents to the hospital with acute respiratory failure.  He was found 

to be hypoxic with oxygen saturation of 88%, tachypneic with his respiratory 

rate as high as 37 as well as a transaminitis and bilateral lung opacities 

consistent with COVID-19.  Patient is septic secondary to COVID-19.  He received

cefepime in the emergency room however patient will be placed on Zithromax, 

dexamethasone, and supplements including zinc and vitamin D3.  I did contact 

pharmacy for initiating from this vi but at this time he has not met the 

hospitals criteria which include a positive COVID test and so we will follow-up 

with this subsequently.  Patient is also noted to have a transaminitis likely 

secondary to COVID





- Time


Time Spent with patient: 15-24 minutes


Medications reviewed and adjusted accordingly: Yes


Anticipated Discharge Disposition: Home, Self Care


Anticipated Discharge Timeframe: within 72 hours

## 2020-09-12 NOTE — RADIOLOGY REPORT (SQ)
EXAM DESCRIPTION:  CHEST SINGLE VIEW



IMAGES COMPLETED DATE/TIME:  9/12/2020 5:29 am



REASON FOR STUDY:  COVID



COMPARISON:  9/11/2020



NUMBER OF VIEWS:  One view.



TECHNIQUE:  Single frontal radiographic image of the chest acquired.



LIMITATIONS:  Poor inspiratory effort.



FINDINGS:  LUNGS AND PLEURA: Bilateral airspace disease, left greater than right not significantly ch
anged.

MEDIASTINUM AND HEART: Stable heart size and mediastinal structures.

BONY STRUCTURES: No acute findings.

HARDWARE: None.

OTHER: No other significant finding.



IMPRESSION:  No significant change.



TECHNICAL DOCUMENTATION:  JOB ID:  [id number]



Reading location - IP/workstation name: [email or web address]

## 2020-09-13 LAB
ADD MANUAL DIFF: NO
ALBUMIN SERPL-MCNC: 2.6 G/DL (ref 3.5–5)
ALP SERPL-CCNC: 452 U/L (ref 38–126)
ANION GAP SERPL CALC-SCNC: 8 MMOL/L (ref 5–19)
ARTERIAL BLOOD FIO2: (no result)
ARTERIAL BLOOD H2CO3: 1.08 MMOL/L (ref 1.05–1.35)
ARTERIAL BLOOD HCO3: 21.6 MMOL/L (ref 20–24)
ARTERIAL BLOOD PCO2: 35.8 MMHG (ref 35–45)
ARTERIAL BLOOD PH: 7.4 (ref 7.35–7.45)
ARTERIAL BLOOD PO2: 68.6 MMHG (ref 80–100)
ARTERIAL BLOOD TOTAL CO2: 22.7 MMOL/L (ref 23–27)
AST SERPL-CCNC: 241 U/L (ref 17–59)
BASE EXCESS BLDA CALC-SCNC: -2.5 MMOL/L
BASOPHILS # BLD AUTO: 0 10^3/UL (ref 0–0.2)
BASOPHILS NFR BLD AUTO: 0.1 % (ref 0–2)
BILIRUB DIRECT SERPL-MCNC: 1 MG/DL (ref 0–0.4)
BILIRUB SERPL-MCNC: 1.6 MG/DL (ref 0.2–1.3)
BUN SERPL-MCNC: 16 MG/DL (ref 7–20)
CALCIUM: 7.6 MG/DL (ref 8.4–10.2)
CHLORIDE SERPL-SCNC: 110 MMOL/L (ref 98–107)
CO2 SERPL-SCNC: 20 MMOL/L (ref 22–30)
CRP SERPL-MCNC: 64.7 MG/L (ref ?–10)
EOSINOPHIL # BLD AUTO: 0 10^3/UL (ref 0–0.6)
EOSINOPHIL NFR BLD AUTO: 0 % (ref 0–6)
ERYTHROCYTE [DISTWIDTH] IN BLOOD BY AUTOMATED COUNT: 15.1 % (ref 11.5–14)
ERYTHROCYTE [SEDIMENTATION RATE] IN BLOOD: 90 MM/HR (ref 0–15)
GLUCOSE SERPL-MCNC: 171 MG/DL (ref 75–110)
HCT VFR BLD CALC: 44.4 % (ref 37.9–51)
HGB BLD-MCNC: 15.5 G/DL (ref 13.5–17)
LYMPHOCYTES # BLD AUTO: 0.8 10^3/UL (ref 0.5–4.7)
LYMPHOCYTES NFR BLD AUTO: 7.8 % (ref 13–45)
MCH RBC QN AUTO: 34.7 PG (ref 27–33.4)
MCHC RBC AUTO-ENTMCNC: 34.9 G/DL (ref 32–36)
MCV RBC AUTO: 100 FL (ref 80–97)
MONOCYTES # BLD AUTO: 0.6 10^3/UL (ref 0.1–1.4)
MONOCYTES NFR BLD AUTO: 6.1 % (ref 3–13)
NEUTROPHILS # BLD AUTO: 8.7 10^3/UL (ref 1.7–8.2)
NEUTS SEG NFR BLD AUTO: 86 % (ref 42–78)
PLATELET # BLD: 145 10^3/UL (ref 150–450)
POTASSIUM SERPL-SCNC: 4.6 MMOL/L (ref 3.6–5)
PROT SERPL-MCNC: 7.2 G/DL (ref 6.3–8.2)
RBC # BLD AUTO: 4.46 10^6/UL (ref 4.35–5.55)
SAO2 % BLDA: 93.8 % (ref 94–98)
TOTAL CELLS COUNTED % (AUTO): 100 %
WBC # BLD AUTO: 10.1 10^3/UL (ref 4–10.5)

## 2020-09-13 PROCEDURE — XW033E5 INTRODUCTION OF REMDESIVIR ANTI-INFECTIVE INTO PERIPHERAL VEIN, PERCUTANEOUS APPROACH, NEW TECHNOLOGY GROUP 5: ICD-10-PCS | Performed by: INTERNAL MEDICINE

## 2020-09-13 RX ADMIN — Medication SCH MG: at 09:19

## 2020-09-13 RX ADMIN — ENOXAPARIN SODIUM SCH MG: 100 INJECTION SUBCUTANEOUS at 22:55

## 2020-09-13 RX ADMIN — PANTOPRAZOLE SODIUM SCH MG: 40 TABLET, DELAYED RELEASE ORAL at 06:00

## 2020-09-13 RX ADMIN — ASPIRIN SCH MG: 81 TABLET, COATED ORAL at 22:55

## 2020-09-13 RX ADMIN — PIPERACILLIN AND TAZOBACTAM SCH MLS/HR: 3; .375 INJECTION, POWDER, LYOPHILIZED, FOR SOLUTION INTRAVENOUS; PARENTERAL at 14:55

## 2020-09-13 RX ADMIN — SODIUM CHLORIDE PRN MLS/HR: 9 INJECTION, SOLUTION INTRAVENOUS at 11:52

## 2020-09-13 RX ADMIN — DEXAMETHASONE SODIUM PHOSPHATE SCH MG: 10 INJECTION INTRAMUSCULAR; INTRAVENOUS at 09:20

## 2020-09-13 RX ADMIN — PIPERACILLIN AND TAZOBACTAM SCH MLS/HR: 3; .375 INJECTION, POWDER, LYOPHILIZED, FOR SOLUTION INTRAVENOUS; PARENTERAL at 22:55

## 2020-09-13 RX ADMIN — VITAMIN D, TAB 1000IU (100/BT) SCH UNIT: 25 TAB at 09:19

## 2020-09-13 RX ADMIN — AZITHROMYCIN MONOHYDRATE SCH MLS/HR: 500 INJECTION, POWDER, LYOPHILIZED, FOR SOLUTION INTRAVENOUS at 11:06

## 2020-09-13 RX ADMIN — ENOXAPARIN SODIUM SCH: 100 INJECTION SUBCUTANEOUS at 10:59

## 2020-09-13 NOTE — PDOC PROGRESS REPORT
Subjective


Progress Note for:: 09/13/20


Subjective:: 





 50 year old male








50-year-old patient presents to the ED with complaints of worsening shortness of

breath, cough associated with blood which started about 3 days ago.  Also 

complains of abdominal pain due to coughing.  He works at a [employer].  Was 

tachypneic and hypoxic initially with a saturation of 88% which improved with 

supplemental oxygen


CT scan of the chest shows extensive bilateral multifocal peripheral and 

parenchymal groundglass opacitiesconsolidation and areas of consolidation in 

the lower lobes bilaterally.  Currently reported imaging features of COVID-19 

pneumonia are present.  There is also mediastinal and right hilar 

lymphadenopathy.  Chest x-ray shows multilobar pneumonia.  CT of the abdomen 

shows hepatic steatosis








: 09/12/20


Subjective:: 





Patient's breathing remains pretty tenuous.  He decompensates with attempts to 

decrease his oxygen.  When I saw him this morning he was on 10 L oxygen with 

facemask.  COVID-19 test is still pending





9/13/2020-patient is on 100% CPAP at this time.  Desaturating without CPAP 

support.  covid  test came back positive.  Discussed case with the pharmacist to

start on remdesivir.. Patient is weaned to room #305.  To continue IV 

dexamethasone 6 mg IV daily.  Started on a treatment dose of Lovenox.  CRP, ESR,

d-dimer is pending at this time.  Condition is critical.  Started on IV Zosyn 

this morning.  Patient is already on Zithromax.


Reason For Visit: 


ATYPICAL PNEUMONIA, R/O COVID 19,ACUTE RESPIRATORY








Physical Exam


Vital Signs: 


                                        











Temp Pulse Resp BP Pulse Ox


 


 96.9 F L  66   27 H  138/82 H  98 


 


 09/13/20 05:32  09/13/20 07:00  09/13/20 08:04  09/13/20 05:32  09/13/20 08:04








                                 Intake & Output











 09/12/20 09/13/20 09/14/20





 06:59 06:59 06:59


 


Intake Total 1800 2710 


 


Output Total 1400 2310 


 


Balance 400 400 


 


Weight 84.6 kg 84.9 kg 











General appearance: PRESENT: obese, other - In moderate distress.


Head exam: PRESENT: atraumatic


Eye exam: PRESENT: PERRLA


Ear exam: PRESENT: normal external ear exam


Mouth exam: PRESENT: neck supple


Teeth exam: PRESENT: poor dentation


Neck exam: ABSENT: carotid bruit, JVD, lymphadenopathy, thyromegaly


Respiratory exam: PRESENT: decreased breath sounds


Cardiovascular exam: PRESENT: tachycardia


GI/Abdominal exam: PRESENT: normal bowel sounds, soft.  ABSENT: distended, 

guarding, mass, organolmegaly, rebound, tenderness


Rectal exam: PRESENT: deferred


Extremities exam: PRESENT: full ROM.  ABSENT: calf tenderness, clubbing, pedal 

edema


Neurological exam: PRESENT: alert, awake, oriented to person, oriented to place,

oriented to time, oriented to situation, CN II-XII grossly intact.  ABSENT: 

motor sensory deficit





Results


Laboratory Results: 


                                        





                                 09/12/20 05:20 





                                 09/12/20 05:20 





                                        











  09/13/20





  06:07


 


Carbonic Acid  1.08


 


HCO3/H2CO3 Ratio  20:1


 


ABG pH  7.40


 


ABG pCO2  35.8


 


ABG pO2  68.6 L


 


ABG HCO3  21.6


 


ABG O2 Saturation  93.8 L


 


ABG Base Excess  -2.5


 


FiO2  100%








                                        











  09/11/20 09/11/20





  08:23 08:23


 


Creatine Kinase  165 


 


CK-MB (CK-2)   1.07


 


Troponin I   < 0.012











Impressions: 


                                        





Abdomen Ultrasound  09/11/20 09:16


IMPRESSION:  No evidence of cholelithiasis or cholecystitis.  Nonvisualization 

of the pancreas and abdominal aorta due to obscuration by intervening bowel gas.

 Otherwise unremarkable examination.


 








Chest/Abdomen CTA  09/11/20 11:25


IMPRESSION:  1.  No evidence for acute pulmonary emboli.


2.  Extensive bilateral multifocal peripheral and parenchymal ground-glass 

opacities/consolidation and areas of consolidation in the lower lobes 

bilaterally.  Commonly reported imaging features of COVID-19 pneumonia are 

present.


3.  Mediastinal and right hilar lymphadenopathy.


3.  Additional findings as above.


 








Abdomen/Pelvis CT  09/11/20 11:26


IMPRESSION:  1.  Hepatic steatosis.


2.  Prominence of the gallbladder wall.  No gallstones.


3.  Small uncomplicated inguinal hernias.


 








Chest X-Ray  09/12/20 06:00


IMPRESSION:  No significant change.


 














Assessment and Plan





- Diagnosis


(1) Acute respiratory failure due to COVID-19


Is this a current diagnosis for this admission?: Yes   


Plan: 


COVID test is pending however clinically patient seems to have an acute resp

iratory illness secondary to COVID








9/13/2020-cover test is positive.  To start the patient on remdesivir.  Patient 

is already on IV dexamethasone.  D-dimer, CRP, ESR is pending at this time.  

Started on a treatment dose of Lovenox.  Added IV Zosyn to Zithromax.  To 

continue CPAP with 100% oxygen.  To repeat the ABG tomorrow morning.








(2) Bilateral pneumonia


Is this a current diagnosis for this admission?: Yes   


Plan: 


CT scan suggest imaging features of COVID-19, mediastinal and right hilar 

lymphadenopathy.  Follow-up chest x-ray today reveals no significant changes





9/13/2020-patient admitted with bilateral pneumonia, CT scan indicate you have 

coronavirus pneumonia.  On dexamethasone, Zithromax to start on remedies away to

day.  Covid test is positive.  Patient works in a [employer].








(3) Hypoxia


Is this a current diagnosis for this admission?: Yes   


Plan: 


9/13/2020-patient admitted with acute hypoxic respiratory failure most likely 

secondary to COVID-19 pneumonia.  On 100% FiO2 on CPAP.








(4) Sepsis


Is this a current diagnosis for this admission?: Yes   


Plan: 


Follow-up labs are improved





9/13/2020-patient came in with elevated WBC count, acute on respiratory failure,

COVID pneumonia meeting the criteria for sepsis.








(5) Transaminitis


Is this a current diagnosis for this admission?: Yes   


Plan: 


Slightly improved we will continue to monitor





9/13/2020-LFTs are improving.  Today's labs are pending at this time.








- Plan Summary


Summary: 


Patient presents to the hospital with acute respiratory failure.  He was found 

to be hypoxic with oxygen saturation of 88%, tachypneic with his respiratory 

rate as high as 37 as well as a transaminitis and bilateral lung opacities 

consistent with COVID-19.  Patient is septic secondary to COVID-19.  He received

cefepime in the emergency room however patient will be placed on Zithromax, 

dexamethasone, and supplements including zinc and vitamin D3.  I did contact 

pharmacy for initiating from this vi but at this time he has not met the 

hospitals criteria which include a positive COVID test and so we will follow-up 

with this subsequently.  Patient is also noted to have a transaminitis likely 

secondary to COVID





- Time


Anticipated Discharge Disposition: Home, Self Care


Anticipated Discharge Timeframe: within 72 hours

## 2020-09-14 LAB
ABSOLUTE LYMPHOCYTES# (MANUAL): 0.5 10^3/UL (ref 0.5–4.7)
ABSOLUTE MONOCYTES # (MANUAL): 0.4 10^3/UL (ref 0.1–1.4)
ADD MANUAL DIFF: YES
ALBUMIN SERPL-MCNC: 2.6 G/DL (ref 3.5–5)
ALP SERPL-CCNC: 435 U/L (ref 38–126)
ANION GAP SERPL CALC-SCNC: 8 MMOL/L (ref 5–19)
ANISOCYTOSIS BLD QL SMEAR: SLIGHT
ARTERIAL BLOOD FIO2: (no result)
ARTERIAL BLOOD H2CO3: 1.12 MMOL/L (ref 1.05–1.35)
ARTERIAL BLOOD HCO3: 24 MMOL/L (ref 20–24)
ARTERIAL BLOOD PCO2: 37.3 MMHG (ref 35–45)
ARTERIAL BLOOD PH: 7.43 (ref 7.35–7.45)
ARTERIAL BLOOD PO2: 61.4 MMHG (ref 80–100)
ARTERIAL BLOOD TOTAL CO2: 25.1 MMOL/L (ref 23–27)
AST SERPL-CCNC: 200 U/L (ref 17–59)
BASE EXCESS BLDA CALC-SCNC: 0 MMOL/L
BASOPHILS NFR BLD MANUAL: 0 % (ref 0–2)
BILIRUB DIRECT SERPL-MCNC: 0.9 MG/DL (ref 0–0.4)
BILIRUB SERPL-MCNC: 1.7 MG/DL (ref 0.2–1.3)
BUN SERPL-MCNC: 16 MG/DL (ref 7–20)
CALCIUM: 7.5 MG/DL (ref 8.4–10.2)
CHLORIDE SERPL-SCNC: 108 MMOL/L (ref 98–107)
CO2 SERPL-SCNC: 23 MMOL/L (ref 22–30)
EOSINOPHIL NFR BLD MANUAL: 0 % (ref 0–6)
ERYTHROCYTE [DISTWIDTH] IN BLOOD BY AUTOMATED COUNT: 14.6 % (ref 11.5–14)
GLUCOSE SERPL-MCNC: 117 MG/DL (ref 75–110)
HCT VFR BLD CALC: 43.1 % (ref 37.9–51)
HGB BLD-MCNC: 15.1 G/DL (ref 13.5–17)
MACROCYTES BLD QL SMEAR: SLIGHT
MCH RBC QN AUTO: 34.8 PG (ref 27–33.4)
MCHC RBC AUTO-ENTMCNC: 35 G/DL (ref 32–36)
MCV RBC AUTO: 99 FL (ref 80–97)
MONOCYTES % (MANUAL): 4 % (ref 3–13)
NEUTS BAND NFR BLD MANUAL: 3 % (ref 3–5)
PLATELET # BLD: 144 10^3/UL (ref 150–450)
PLATELET COMMENT: (no result)
POTASSIUM SERPL-SCNC: 4.5 MMOL/L (ref 3.6–5)
PROT SERPL-MCNC: 7 G/DL (ref 6.3–8.2)
RBC # BLD AUTO: 4.34 10^6/UL (ref 4.35–5.55)
SAO2 % BLDA: 92.2 % (ref 94–98)
SEGMENTED NEUTROPHILS % (MAN): 88 % (ref 42–78)
TOTAL CELLS COUNTED BLD: 100
VARIANT LYMPHS NFR BLD MANUAL: 5 % (ref 13–45)
WBC # BLD AUTO: 9.2 10^3/UL (ref 4–10.5)

## 2020-09-14 PROCEDURE — XW13325 TRANSFUSION OF CONVALESCENT PLASMA (NONAUTOLOGOUS) INTO PERIPHERAL VEIN, PERCUTANEOUS APPROACH, NEW TECHNOLOGY GROUP 5: ICD-10-PCS | Performed by: INTERNAL MEDICINE

## 2020-09-14 RX ADMIN — PIPERACILLIN AND TAZOBACTAM SCH MLS/HR: 3; .375 INJECTION, POWDER, LYOPHILIZED, FOR SOLUTION INTRAVENOUS; PARENTERAL at 14:04

## 2020-09-14 RX ADMIN — PIPERACILLIN AND TAZOBACTAM SCH MLS/HR: 3; .375 INJECTION, POWDER, LYOPHILIZED, FOR SOLUTION INTRAVENOUS; PARENTERAL at 05:43

## 2020-09-14 RX ADMIN — ENOXAPARIN SODIUM SCH MG: 100 INJECTION SUBCUTANEOUS at 21:57

## 2020-09-14 RX ADMIN — PANTOPRAZOLE SODIUM SCH MG: 40 TABLET, DELAYED RELEASE ORAL at 05:43

## 2020-09-14 RX ADMIN — VITAMIN D, TAB 1000IU (100/BT) SCH UNIT: 25 TAB at 10:00

## 2020-09-14 RX ADMIN — ENOXAPARIN SODIUM SCH MG: 100 INJECTION SUBCUTANEOUS at 10:01

## 2020-09-14 RX ADMIN — DEXAMETHASONE SODIUM PHOSPHATE SCH MG: 10 INJECTION INTRAMUSCULAR; INTRAVENOUS at 10:00

## 2020-09-14 RX ADMIN — Medication SCH MG: at 10:00

## 2020-09-14 RX ADMIN — PIPERACILLIN AND TAZOBACTAM SCH MLS/HR: 3; .375 INJECTION, POWDER, LYOPHILIZED, FOR SOLUTION INTRAVENOUS; PARENTERAL at 21:58

## 2020-09-14 RX ADMIN — SODIUM CHLORIDE PRN MLS/HR: 9 INJECTION, SOLUTION INTRAVENOUS at 10:00

## 2020-09-14 RX ADMIN — AZITHROMYCIN MONOHYDRATE SCH MLS/HR: 500 INJECTION, POWDER, LYOPHILIZED, FOR SOLUTION INTRAVENOUS at 10:00

## 2020-09-14 RX ADMIN — REMDESIVIR SCH MLS/HR: 100 INJECTION, POWDER, LYOPHILIZED, FOR SOLUTION INTRAVENOUS at 10:59

## 2020-09-14 RX ADMIN — ASPIRIN SCH MG: 81 TABLET, COATED ORAL at 21:57

## 2020-09-14 NOTE — PDOC PROGRESS REPORT
Subjective


Progress Note for:: 09/14/20


Subjective:: 





 50 year old male








50-year-old patient presents to the ED with complaints of worsening shortness of

breath, cough associated with blood which started about 3 days ago.  Also 

complains of abdominal pain due to coughing.  He works at a [employer].  Was 

tachypneic and hypoxic initially with a saturation of 88% which improved with 

supplemental oxygen


CT scan of the chest shows extensive bilateral multifocal peripheral and 

parenchymal groundglass opacitiesconsolidation and areas of consolidation in 

the lower lobes bilaterally.  Currently reported imaging features of COVID-19 

pneumonia are present.  There is also mediastinal and right hilar 

lymphadenopathy.  Chest x-ray shows multilobar pneumonia.  CT of the abdomen 

shows hepatic steatosis








: 09/12/20


Subjective:: 





Patient's breathing remains pretty tenuous.  He decompensates with attempts to 

decrease his oxygen.  When I saw him this morning he was on 10 L oxygen with 

facemask.  COVID-19 test is still pending





9/13/2020-patient is on 100% CPAP at this time.  Desaturating without CPAP 

support.  covid  test came back positive.  Discussed case with the pharmacist to

start on remdesivir.. Patient is weaned to room #305.  To continue IV 

dexamethasone 6 mg IV daily.  Started on a treatment dose of Lovenox.  CRP, ESR,

d-dimer is pending at this time.  Condition is critical.  Started on IV Zosyn 

this morning.  Patient is already on Zithromax.





9/14/2020-no acute events in the last 24 hours.  Afebrile.  Patient is still on 

15 L of oxygen pulse ox 100%.  ABG was done this morning and 100% oxygen pH is 

7.43/pco2 37/PO2 61/bicarb 24, oxygen saturation is 92%.  Patient is receiving 

convulsant plasma, remidesevir, dexamethasone patient is on a treatment dose of 

Lovenox.


Reason For Visit: 


ATYPICAL PNEUMONIA, R/O COVID 19,ACUTE RESPIRATORY








Physical Exam


Vital Signs: 


                                        











Temp Pulse Resp BP Pulse Ox


 


 98.3 F   68   28 H  147/83 H  93 


 


 09/14/20 03:11  09/14/20 07:00  09/14/20 03:11  09/14/20 03:11  09/14/20 04:05








                                 Intake & Output











 09/13/20 09/14/20 09/15/20





 06:59 06:59 06:59


 


Intake Total 9864 2093 


 


Output Total 2310 2120 


 


Balance 400 534 


 


Weight 84.9 kg 84.5 kg 











General appearance: PRESENT: no acute distress, obese


Head exam: PRESENT: atraumatic


Eye exam: PRESENT: PERRLA


Ear exam: PRESENT: normal external ear exam


Mouth exam: PRESENT: neck supple


Teeth exam: PRESENT: poor dentation


Neck exam: ABSENT: carotid bruit, JVD, lymphadenopathy, thyromegaly


Respiratory exam: PRESENT: decreased breath sounds


Cardiovascular exam: PRESENT: RRR.  ABSENT: diastolic murmur, rubs, systolic 

murmur


GI/Abdominal exam: PRESENT: normal bowel sounds, soft.  ABSENT: distended, 

guarding, mass, organolmegaly, rebound, tenderness


Rectal exam: PRESENT: deferred


Extremities exam: PRESENT: full ROM.  ABSENT: calf tenderness, clubbing, pedal 

edema


Neurological exam: PRESENT: alert, awake, oriented to person, oriented to place,

oriented to time, oriented to situation, CN II-XII grossly intact.  ABSENT: 

motor sensory deficit


Psychiatric exam: PRESENT: appropriate affect, normal mood.  ABSENT: homicidal 

ideation, suicidal ideation





Results


Laboratory Results: 


                                        





                                 09/13/20 12:07 





                                 09/13/20 12:07 





                                        











  09/13/20 09/13/20 09/13/20





  12:07 12:07 14:40


 


WBC  10.1  


 


RBC  4.46  


 


Hgb  15.5  


 


Hct  44.4  


 


MCV  100 H  


 


MCH  34.7 H  


 


MCHC  34.9  


 


RDW  15.1 H  


 


Plt Count  145 L  


 


Seg Neutrophils %  86.0 H  


 


Carbonic Acid   


 


HCO3/H2CO3 Ratio   


 


ABG pH   


 


ABG pCO2   


 


ABG pO2   


 


ABG HCO3   


 


ABG O2 Saturation   


 


ABG Base Excess   


 


FiO2   


 


Sodium   138.2 


 


Potassium   4.6 


 


Chloride   110 H 


 


Carbon Dioxide   20 L 


 


Anion Gap   8 


 


BUN   16 


 


Creatinine   0.56 


 


Est GFR ( Amer)   > 60 


 


Glucose   171 H 


 


Calcium   7.6 L 


 


Magnesium   2.5 H 


 


Total Bilirubin   1.6 H 


 


AST   241 H 


 


Alkaline Phosphatase   452 H 


 


C-Reactive Protein   64.7 H 


 


Total Protein   7.2 


 


Albumin   2.6 L 


 


Blood Type    O POSITIVE














  09/14/20





  06:45


 


WBC 


 


RBC 


 


Hgb 


 


Hct 


 


MCV 


 


MCH 


 


MCHC 


 


RDW 


 


Plt Count 


 


Seg Neutrophils % 


 


Carbonic Acid  1.12


 


HCO3/H2CO3 Ratio  21:1


 


ABG pH  7.43


 


ABG pCO2  37.3


 


ABG pO2  61.4 L


 


ABG HCO3  24.0


 


ABG O2 Saturation  92.2 L


 


ABG Base Excess  0


 


FiO2  100%


 


Sodium 


 


Potassium 


 


Chloride 


 


Carbon Dioxide 


 


Anion Gap 


 


BUN 


 


Creatinine 


 


Est GFR (African Amer) 


 


Glucose 


 


Calcium 


 


Magnesium 


 


Total Bilirubin 


 


AST 


 


Alkaline Phosphatase 


 


C-Reactive Protein 


 


Total Protein 


 


Albumin 


 


Blood Type 








                                        











  09/11/20 09/11/20





  08:23 08:23


 


Creatine Kinase  165 


 


CK-MB (CK-2)   1.07


 


Troponin I   < 0.012











Impressions: 


                                        





Abdomen Ultrasound  09/11/20 09:16


IMPRESSION:  No evidence of cholelithiasis or cholecystitis.  Nonvisualization 

of the pancreas and abdominal aorta due to obscuration by intervening bowel gas.

 Otherwise unremarkable examination.


 








Chest/Abdomen CTA  09/11/20 11:25


IMPRESSION:  1.  No evidence for acute pulmonary emboli.


2.  Extensive bilateral multifocal peripheral and parenchymal ground-glass 

opacities/consolidation and areas of consolidation in the lower lobes 

bilaterally.  Commonly reported imaging features of COVID-19 pneumonia are 

present.


3.  Mediastinal and right hilar lymphadenopathy.


3.  Additional findings as above.


 








Abdomen/Pelvis CT  09/11/20 11:26


IMPRESSION:  1.  Hepatic steatosis.


2.  Prominence of the gallbladder wall.  No gallstones.


3.  Small uncomplicated inguinal hernias.


 








Chest X-Ray  09/12/20 06:00


IMPRESSION:  No significant change.


 














Assessment and Plan





- Diagnosis


(1) Acute respiratory failure due to COVID-19


Is this a current diagnosis for this admission?: Yes   


Plan: 


COVID test is pending however clinically patient seems to have an acute 

respiratory illness secondary to COVID








9/13/2020-covid test is positive.  To start the patient on remdesivir.  Patient 

is already on IV dexamethasone.  D-dimer, CRP, ESR is pending at this time.  

Started on a treatment dose of Lovenox.  Added IV Zosyn to Zithromax.  To 

continue CPAP with 100% oxygen.  To repeat the ABG tomorrow morning.





9/14/20-patient is receiving convulsant plasma, IV dexamethasone 60 mg daily, IV

Zithromax, IV Zosyn, full dose of Lovenox, remedesivir.  Patient is comfortably 

in the bed denies any problems.  Is on CPAP 100%.  Plan is to continue the 

present management and to repeat the labs including ABG tomorrow morning.








(2) Bilateral pneumonia


Is this a current diagnosis for this admission?: Yes   


Plan: 


CT scan suggest imaging features of COVID-19, mediastinal and right hilar 

lymphadenopathy.  Follow-up chest x-ray today reveals no significant changes





9/13/2020-patient admitted with bilateral pneumonia, CT scan indicate you have 

coronavirus pneumonia.  On dexamethasone, Zithromax to start on remedies away 

today.  Covid test is positive.  Patient works in a [employer].





9/14/2020-patient is receiving IV Zosyn, IV Zithromax at this time.  Blood 

cultures are negative.  COVID test positive.








(3) Hypoxia


Is this a current diagnosis for this admission?: Yes   


Plan: 


9/13/2020-patient admitted with acute hypoxic respiratory failure most likely 

secondary to COVID-19 pneumonia.  On 100% FiO2 on CPAP.





9/14/2020-ABG was done 100% oxygen pH is 1.43/PCO2 37/PO2 62/bicarb is 24/oxygen

saturation is 92%.  Plan is to continue CPAP and a repeat the labs tomorrow.








(4) Sepsis


Is this a current diagnosis for this admission?: Yes   


Plan: 


Follow-up labs are improved





9/13/2020-patient came in with elevated WBC count, acute on respiratory failure,

COVID pneumonia meeting the criteria for sepsis.








(5) Transaminitis


Is this a current diagnosis for this admission?: Yes   


Plan: 


Slightly improved we will continue to monitor





9/13/2020-LFTs are improving.  Today's labs are pending at this time.








(6) Obesity (BMI 30.0-34.9)


Is this a current diagnosis for this admission?: No   


Plan: 


9/14/2020-BMI is more than 34.  Diet exercise weight loss lifestyle 

modifications discussed with the patient.








- Plan Summary


Summary: 


Patient presents to the hospital with acute respiratory failure.  He was found 

to be hypoxic with oxygen saturation of 88%, tachypneic with his respiratory 

rate as high as 37 as well as a transaminitis and bilateral lung opacities 

consistent with COVID-19.  Patient is septic secondary to COVID-19.  He received

cefepime in the emergency room however patient will be placed on Zithromax, 

dexamethasone, and supplements including zinc and vitamin D3.  I did contact 

pharmacy for initiating from this vi but at this time he has not met the 

hospitals criteria which include a positive COVID test and so we will follow-up 

with this subsequently.  Patient is also noted to have a transaminitis likely 

secondary to COVID





- Time


Anticipated Discharge Disposition: Home, Self Care


Anticipated Discharge Timeframe: within 72 hours

## 2020-09-15 LAB
ADD MANUAL DIFF: NO
ALBUMIN SERPL-MCNC: 2.6 G/DL (ref 3.5–5)
ALP SERPL-CCNC: 392 U/L (ref 38–126)
ANION GAP SERPL CALC-SCNC: 8 MMOL/L (ref 5–19)
ARTERIAL BLOOD H2CO3: 1.06 MMOL/L (ref 1.05–1.35)
ARTERIAL BLOOD HCO3: 21.3 MMOL/L (ref 20–24)
ARTERIAL BLOOD PCO2: 35.1 MMHG (ref 35–45)
ARTERIAL BLOOD PH: 7.4 (ref 7.35–7.45)
ARTERIAL BLOOD PO2: 70 MMHG (ref 80–100)
ARTERIAL BLOOD TOTAL CO2: 22.3 MMOL/L (ref 23–27)
AST SERPL-CCNC: 174 U/L (ref 17–59)
BASE EXCESS BLDA CALC-SCNC: -2.8 MMOL/L
BASOPHILS # BLD AUTO: 0 10^3/UL (ref 0–0.2)
BASOPHILS NFR BLD AUTO: 0.1 % (ref 0–2)
BILIRUB DIRECT SERPL-MCNC: 1.1 MG/DL (ref 0–0.4)
BILIRUB SERPL-MCNC: 1.9 MG/DL (ref 0.2–1.3)
BUN SERPL-MCNC: 16 MG/DL (ref 7–20)
CALCIUM: 7.7 MG/DL (ref 8.4–10.2)
CHLORIDE SERPL-SCNC: 107 MMOL/L (ref 98–107)
CO2 SERPL-SCNC: 22 MMOL/L (ref 22–30)
CRP SERPL-MCNC: 82.4 MG/L (ref ?–10)
EOSINOPHIL # BLD AUTO: 0 10^3/UL (ref 0–0.6)
EOSINOPHIL NFR BLD AUTO: 0 % (ref 0–6)
ERYTHROCYTE [DISTWIDTH] IN BLOOD BY AUTOMATED COUNT: 14.6 % (ref 11.5–14)
ERYTHROCYTE [SEDIMENTATION RATE] IN BLOOD: 90 MM/HR (ref 0–15)
GLUCOSE SERPL-MCNC: 124 MG/DL (ref 75–110)
HCT VFR BLD CALC: 43.4 % (ref 37.9–51)
HGB BLD-MCNC: 15.2 G/DL (ref 13.5–17)
LYMPHOCYTES # BLD AUTO: 1 10^3/UL (ref 0.5–4.7)
LYMPHOCYTES NFR BLD AUTO: 8.9 % (ref 13–45)
MCH RBC QN AUTO: 34.6 PG (ref 27–33.4)
MCHC RBC AUTO-ENTMCNC: 35.1 G/DL (ref 32–36)
MCV RBC AUTO: 99 FL (ref 80–97)
MONOCYTES # BLD AUTO: 0.3 10^3/UL (ref 0.1–1.4)
MONOCYTES NFR BLD AUTO: 2.6 % (ref 3–13)
NEUTROPHILS # BLD AUTO: 9.5 10^3/UL (ref 1.7–8.2)
NEUTS SEG NFR BLD AUTO: 88.4 % (ref 42–78)
PLATELET # BLD: 156 10^3/UL (ref 150–450)
POTASSIUM SERPL-SCNC: 4.4 MMOL/L (ref 3.6–5)
PROT SERPL-MCNC: 6.9 G/DL (ref 6.3–8.2)
RBC # BLD AUTO: 4.39 10^6/UL (ref 4.35–5.55)
SAO2 % BLDA: 94.2 % (ref 94–98)
TOTAL CELLS COUNTED % (AUTO): 100 %
WBC # BLD AUTO: 10.8 10^3/UL (ref 4–10.5)

## 2020-09-15 RX ADMIN — VITAMIN D, TAB 1000IU (100/BT) SCH UNIT: 25 TAB at 09:58

## 2020-09-15 RX ADMIN — PIPERACILLIN AND TAZOBACTAM SCH MLS/HR: 3; .375 INJECTION, POWDER, LYOPHILIZED, FOR SOLUTION INTRAVENOUS; PARENTERAL at 14:37

## 2020-09-15 RX ADMIN — DEXAMETHASONE SODIUM PHOSPHATE SCH MG: 10 INJECTION INTRAMUSCULAR; INTRAVENOUS at 09:56

## 2020-09-15 RX ADMIN — AZITHROMYCIN MONOHYDRATE SCH MLS/HR: 500 INJECTION, POWDER, LYOPHILIZED, FOR SOLUTION INTRAVENOUS at 09:58

## 2020-09-15 RX ADMIN — PIPERACILLIN AND TAZOBACTAM SCH MLS/HR: 3; .375 INJECTION, POWDER, LYOPHILIZED, FOR SOLUTION INTRAVENOUS; PARENTERAL at 21:54

## 2020-09-15 RX ADMIN — REMDESIVIR SCH MLS/HR: 100 INJECTION, POWDER, LYOPHILIZED, FOR SOLUTION INTRAVENOUS at 09:57

## 2020-09-15 RX ADMIN — PANTOPRAZOLE SODIUM SCH MG: 40 TABLET, DELAYED RELEASE ORAL at 05:07

## 2020-09-15 RX ADMIN — ENOXAPARIN SODIUM SCH MG: 100 INJECTION SUBCUTANEOUS at 09:57

## 2020-09-15 RX ADMIN — ASPIRIN SCH MG: 81 TABLET, COATED ORAL at 21:54

## 2020-09-15 RX ADMIN — PIPERACILLIN AND TAZOBACTAM SCH MLS/HR: 3; .375 INJECTION, POWDER, LYOPHILIZED, FOR SOLUTION INTRAVENOUS; PARENTERAL at 05:07

## 2020-09-15 RX ADMIN — Medication SCH MG: at 09:58

## 2020-09-15 RX ADMIN — SODIUM CHLORIDE PRN MLS/HR: 9 INJECTION, SOLUTION INTRAVENOUS at 20:35

## 2020-09-15 RX ADMIN — ENOXAPARIN SODIUM SCH MG: 100 INJECTION SUBCUTANEOUS at 21:54

## 2020-09-15 NOTE — RADIOLOGY REPORT (SQ)
EXAM DESCRIPTION:  CHEST SINGLE VIEW



IMAGES COMPLETED DATE/TIME:  9/15/2020 1:13 pm



REASON FOR STUDY:  Change in Respiratory Status



COMPARISON:  9/12/2020



EXAM PARAMETERS:  NUMBER OF VIEWS: One view.

TECHNIQUE: Single frontal radiographic view of the chest acquired.

RADIATION DOSE: NA

LIMITATIONS: None.



FINDINGS:  LUNGS AND PLEURA: There is significantly improved aeration in both lungs with perhaps mild
 residual infiltrates.

MEDIASTINUM AND HILAR STRUCTURES: No mediastinal or hilar adenopathy or masses.

HEART AND VASCULAR STRUCTURES: Heart normal in size.  Normal vasculature.

BONES: No acute findings.

HARDWARE: None in the chest.

OTHER: No other significant finding.



IMPRESSION:  Significant improvement in the appearance of the chest with what appears to be mild resi
dual airspace disease.



TECHNICAL DOCUMENTATION:  JOB ID:  [id number]

 2011 [employer]- All Rights Reserved



Reading location - IP/workstation name: [email or web address]

## 2020-09-15 NOTE — PDOC PROGRESS REPORT
Subjective


Progress Note for:: 09/15/20


Subjective:: 





Per previous physician:


"50-year-old patient presents to the ED with complaints of worsening shortness 

of breath, cough associated with blood which started about 3 days ago.  Also 

complains of abdominal pain due to coughing.  He works at a [employer].  Was 

tachypneic and hypoxic initially with a saturation of 88% which improved with 

supplemental oxygen


CT scan of the chest shows extensive bilateral multifocal peripheral and 

parenchymal groundglass opacitiesconsolidation and areas of consolidation in 

the lower lobes bilaterally.  Currently reported imaging features of COVID-19 

pneumonia are present.  There is also mediastinal and right hilar 

lymphadenopathy.  Chest x-ray shows multilobar pneumonia.  CT of the abdomen 

shows hepatic steatosis





9/13/2020-patient is on 100% CPAP at this time.  Desaturating without CPAP 

support.  covid  test came back positive.  Discussed case with the pharmacist to

start on remdesivir.. Patient is weaned to room #305.  To continue IV 

dexamethasone 6 mg IV daily.  Started on a treatment dose of Lovenox.  CRP, ESR,

d-dimer is pending at this time.  Condition is critical.  Started on IV Zosyn 

this morning.  Patient is already on Zithromax.





9/14/2020-no acute events in the last 24 hours.  Afebrile.  Patient is still on 

15 L of oxygen pulse ox 100%.  ABG was done this morning and 100% oxygen pH is 

7.43/pco2 37/PO2 61/bicarb 24, oxygen saturation is 92%.  Patient is receiving 

convulsant plasma, remidesevir, dexamethasone patient is on a treatment dose of 

Lovenox."





9/15/2020


Patient is still being maintained on 100% FiO2 on CPAP, essentially the maximum 

limits of noninvasive ventilatory support.  If patient decompensates further, he

will need to be intubated and sent to the ICU.  This was discussed with nursing 

and with the patient as well.  His chest x-ray did show significant improvement 

but clinically is not significantly improved and desaturates easily.  Bilirubin 

and CRP are higher as well as higher d-dimer and ferritin.  He is maintained on 

remdesivir, dexamethasone, treatment dose Lovenox, and antibiotics.  He has no 

new complaints today.


Reason For Visit: 


ATYPICAL PNEUMONIA, R/O COVID 19,ACUTE RESPIRATORY








Physical Exam


Vital Signs: 


                                        











Temp Pulse Resp BP Pulse Ox


 


 98.7 F   72   32 H  153/80 H  94 


 


 09/15/20 07:47  09/15/20 14:00  09/15/20 16:40  09/15/20 04:24  09/15/20 16:40








                                 Intake & Output











 09/14/20 09/15/20 09/16/20





 06:59 06:59 06:59


 


Intake Total 2654 1160 350


 


Output Total 2122 5635 


 


Balance 534 -1365 350


 


Weight 84.5 kg 84.9 kg 











General appearance: PRESENT: no acute distress, well-developed, well-nourished


Head exam: PRESENT: atraumatic, normocephalic


Eye exam: PRESENT: conjunctiva pink


Mouth exam: PRESENT: moist


Respiratory exam: PRESENT: decreased breath sounds - Notably decreased breath 

sounds bilaterally.  ABSENT: rales, rhonchi, wheezes


Cardiovascular exam: PRESENT: RRR.  ABSENT: diastolic murmur, rubs, systolic 

murmur


GI/Abdominal exam: PRESENT: normal bowel sounds, soft.  ABSENT: distended, 

guarding, mass, organolmegaly, rebound, tenderness


Neurological exam: PRESENT: alert, awake, oriented to person, oriented to place,

oriented to time, oriented to situation


Psychiatric exam: PRESENT: appropriate affect, normal mood


Skin exam: PRESENT: dry, intact, warm





Results


Laboratory Results: 


                                        





                                 09/15/20 04:42 





                                 09/15/20 04:42 





                                        











  09/15/20 09/15/20 09/15/20





  04:42 04:42 05:45


 


WBC  10.8 H  


 


RBC  4.39  


 


Hgb  15.2  


 


Hct  43.4  


 


MCV  99 H  


 


MCH  34.6 H  


 


MCHC  35.1  


 


RDW  14.6 H  


 


Plt Count  156  


 


Seg Neutrophils %  88.4 H  


 


Carbonic Acid    1.06


 


HCO3/H2CO3 Ratio    20:1


 


ABG pH    7.40


 


ABG pCO2    35.1


 


ABG pO2    70.0 L


 


ABG HCO3    21.3


 


ABG O2 Saturation    94.2


 


ABG Base Excess    -2.8


 


FiO2    95%


 


Sodium   137.3 


 


Potassium   4.4 


 


Chloride   107 


 


Carbon Dioxide   22 


 


Anion Gap   8 


 


BUN   16 


 


Creatinine   0.52 


 


Est GFR ( Amer)   > 60 


 


Glucose   124 H 


 


Calcium   7.7 L 


 


Magnesium   2.5 H 


 


Ferritin   1130.00 H 


 


Total Bilirubin   1.9 H 


 


AST   174 H 


 


Alkaline Phosphatase   392 H 


 


C-Reactive Protein   82.4 H 


 


Total Protein   6.9 


 


Albumin   2.6 L 








                                        











  09/11/20 09/11/20





  08:23 08:23


 


Creatine Kinase  165 


 


CK-MB (CK-2)   1.07


 


Troponin I   < 0.012











Impressions: 


                                        





Abdomen Ultrasound  09/11/20 09:16


IMPRESSION:  No evidence of cholelithiasis or cholecystitis.  Nonvisualization 

of the pancreas and abdominal aorta due to obscuration by intervening bowel gas.

 Otherwise unremarkable examination.


 








Chest/Abdomen CTA  09/11/20 11:25


IMPRESSION:  1.  No evidence for acute pulmonary emboli.


2.  Extensive bilateral multifocal peripheral and parenchymal ground-glass 

opacities/consolidation and areas of consolidation in the lower lobes 

bilaterally.  Commonly reported imaging features of COVID-19 pneumonia are 

present.


3.  Mediastinal and right hilar lymphadenopathy.


3.  Additional findings as above.


 








Abdomen/Pelvis CT  09/11/20 11:26


IMPRESSION:  1.  Hepatic steatosis.


2.  Prominence of the gallbladder wall.  No gallstones.


3.  Small uncomplicated inguinal hernias.


 








Chest X-Ray  09/15/20 00:00


IMPRESSION:  Significant improvement in the appearance of the chest with what 

appears to be mild residual airspace disease.


 














Assessment and Plan





- Diagnosis


(1) Acute respiratory failure due to COVID-19


Is this a current diagnosis for this admission?: Yes   


Plan: 


Per previous physician:


"COVID test is pending however clinically patient seems to have an acute 

respiratory illness secondary to COVID


9/13/2020-covid test is positive.  To start the patient on remdesivir.  Patient 

is already on IV dexamethasone.  D-dimer, CRP, ESR is pending at this time.  

Started on a treatment dose of Lovenox.  Added IV Zosyn to Zithromax.  To osiris

nue CPAP with 100% oxygen.  To repeat the ABG tomorrow morning


9/14/20-patient is receiving convulsant plasma, IV dexamethasone 60 mg daily, IV

Zithromax, IV Zosyn, full dose of Lovenox, remedesivir.  Patient is comfortably 

in the bed denies any problems.  Is on CPAP 100%.  Plan is to continue the p

resent management and to repeat the labs including ABG tomorrow morning."





9/15/2020


Continued on dexamethasone, treatment dose Lovenox


All cultures have been negative and there does not appear to be a bacterial 

component to his pneumonia, stop antibiotics as they recommended against in the 

setting of obvious COVID pneumonia without bacterial component


Stop Remdesivir due to rising alk phos and bilirubin in the setting of elevated

LFTs per the  recommendations for experimental use in the setting of

COVID-19 pneumonia


Continues to be maxed out on 100% FiO2 on CPAP, next up is intubation if he 

further decompensates








(2) Bilateral pneumonia


Qualifiers: 


   Pneumonia type: due to unspecified organism 


Is this a current diagnosis for this admission?: Yes   


Plan: 


Per previous physician:


"CT scan suggest imaging features of COVID-19, mediastinal and right hilar 

lymphadenopathy.  Follow-up chest x-ray today reveals no significant changes





9/13/2020-patient admitted with bilateral pneumonia, CT scan indicate you have 

coronavirus pneumonia.  On dexamethasone, Zithromax to start on remedies away 

today.  Covid test is positive.  Patient works in a [employer].





9/14/2020-patient is receiving IV Zosyn, IV Zithromax at this time.  Blood 

cultures are negative.  COVID test positive."





Stopped all antibiotics as cultures are negative and he clearly has COVID-19 

pneumonia








(3) Hypoxia


Is this a current diagnosis for this admission?: Yes   


Plan: 


Per previous physician:


"9/13/2020-patient admitted with acute hypoxic respiratory failure most likely 

secondary to COVID-19 pneumonia.  On 100% FiO2 on CPAP.





9/14/2020-ABG was done 100% oxygen pH is 1.43/PCO2 37/PO2 62/bicarb is 24/oxygen

saturation is 92%.  Plan is to continue CPAP and a repeat the labs tomorrow."





9/15/2022


ABG seems to be a bit improved with higher pO2


Repeat chest x-ray is improved as well


Clinically patient seems to be just is better worse than he has been previously








(4) Obesity (BMI 30.0-34.9)


Is this a current diagnosis for this admission?: No   





(5) Sepsis


Qualifiers: 


   Sepsis type: sepsis due to unspecified organism 


Is this a current diagnosis for this admission?: Yes   


Plan: 





Due to viral pneumonia and cytokine storm from COVID-19








(6) Suspected COVID-19 virus infection


Is this a current diagnosis for this admission?: Yes   





(7) Transaminitis


Is this a current diagnosis for this admission?: Yes   


Plan: 








9/15/2020


Possibly due to Remdesivir, discontinued per 's recommendations


Get portal vein PVL to rule out thrombosis








- Plan Summary


Summary: 


Patient presents to the hospital with acute respiratory failure.  He was found 

to be hypoxic with oxygen saturation of 88%, tachypneic with his respiratory 

rate as high as 37 as well as a transaminitis and bilateral lung opacities 

consistent with COVID-19.  Patient is septic secondary to COVID-19.  He received

cefepime in the emergency room however patient will be placed on Zithromax, dex

amethasone, and supplements including zinc and vitamin D3.  I did contact 

pharmacy for initiating from this vi but at this time he has not met the 

hospitals criteria which include a positive COVID test and so we will follow-up 

with this subsequently.  Patient is also noted to have a transaminitis likely 

secondary to COVID





- Time


Time Spent with patient: 35 or more minutes


Medications reviewed and adjusted accordingly: Yes


Anticipated Discharge Disposition: Skilled Nursing Facility


Anticipated Discharge Timeframe: within 72 hours





- Inpatient Certification


Based on my medical assessment, after consideration of the patient's 

comorbidities, presenting symptoms, or acuity I expect that the services needed 

warrant INPATIENT care.: Yes


I certify that my determination is in accordance with my understanding of 

Medicare's requirements for reasonable and necessary INPATIENT services [42 CFR 

412.3e].: Yes


Medical Necessity: Significant Comorbidiites Make Outpatient Treatment Too 

Risky, Need Close Monitoring Due to Risk of Patient Decompensation, Risk of 

Complication if Not Cared For in Hospital, Risk of Diagnosis Which Will Require 

Inpatient Eval/Care/Monitoring

## 2020-09-16 LAB
ARTERIAL BLOOD FIO2: (no result)
ARTERIAL BLOOD FIO2: (no result)
ARTERIAL BLOOD H2CO3: 1.01 MMOL/L (ref 1.05–1.35)
ARTERIAL BLOOD H2CO3: 1.38 MMOL/L (ref 1.05–1.35)
ARTERIAL BLOOD HCO3: 18.6 MMOL/L (ref 20–24)
ARTERIAL BLOOD HCO3: 23.2 MMOL/L (ref 20–24)
ARTERIAL BLOOD PCO2: 33.7 MMHG (ref 35–45)
ARTERIAL BLOOD PCO2: 45.7 MMHG (ref 35–45)
ARTERIAL BLOOD PH: 7.23 (ref 7.35–7.45)
ARTERIAL BLOOD PH: 7.46 (ref 7.35–7.45)
ARTERIAL BLOOD PO2: 49.9 MMHG (ref 80–100)
ARTERIAL BLOOD PO2: 52.3 MMHG (ref 80–100)
ARTERIAL BLOOD TOTAL CO2: 20 MMOL/L (ref 23–27)
ARTERIAL BLOOD TOTAL CO2: 24.2 MMOL/L (ref 23–27)
BASE EXCESS BLDA CALC-SCNC: -8.9 MMOL/L
BASE EXCESS BLDA CALC-SCNC: 0 MMOL/L
SAO2 % BLDA: 80.4 % (ref 94–98)
SAO2 % BLDA: 87.5 % (ref 94–98)

## 2020-09-16 PROCEDURE — 5A1945Z RESPIRATORY VENTILATION, 24-96 CONSECUTIVE HOURS: ICD-10-PCS | Performed by: HOSPITALIST

## 2020-09-16 PROCEDURE — 0BH17EZ INSERTION OF ENDOTRACHEAL AIRWAY INTO TRACHEA, VIA NATURAL OR ARTIFICIAL OPENING: ICD-10-PCS

## 2020-09-16 RX ADMIN — PROPOFOL PRN MLS/HR: 10 INJECTION, EMULSION INTRAVENOUS at 11:00

## 2020-09-16 RX ADMIN — VITAMIN D, TAB 1000IU (100/BT) SCH: 25 TAB at 09:16

## 2020-09-16 RX ADMIN — PIPERACILLIN AND TAZOBACTAM SCH MLS/HR: 3; .375 INJECTION, POWDER, LYOPHILIZED, FOR SOLUTION INTRAVENOUS; PARENTERAL at 14:36

## 2020-09-16 RX ADMIN — AZITHROMYCIN MONOHYDRATE SCH MLS/HR: 500 INJECTION, POWDER, LYOPHILIZED, FOR SOLUTION INTRAVENOUS at 09:19

## 2020-09-16 RX ADMIN — ENOXAPARIN SODIUM SCH MG: 100 INJECTION SUBCUTANEOUS at 09:23

## 2020-09-16 RX ADMIN — DEXAMETHASONE SODIUM PHOSPHATE SCH MG: 10 INJECTION INTRAMUSCULAR; INTRAVENOUS at 09:20

## 2020-09-16 RX ADMIN — HYDROMORPHONE HYDROCHLORIDE PRN MG: 2 INJECTION INTRAMUSCULAR; INTRAVENOUS; SUBCUTANEOUS at 17:24

## 2020-09-16 RX ADMIN — PROPOFOL PRN MLS/HR: 10 INJECTION, EMULSION INTRAVENOUS at 18:05

## 2020-09-16 RX ADMIN — PANTOPRAZOLE SODIUM SCH MG: 40 TABLET, DELAYED RELEASE ORAL at 05:12

## 2020-09-16 RX ADMIN — PIPERACILLIN AND TAZOBACTAM SCH MLS/HR: 3; .375 INJECTION, POWDER, LYOPHILIZED, FOR SOLUTION INTRAVENOUS; PARENTERAL at 05:11

## 2020-09-16 RX ADMIN — Medication SCH: at 15:22

## 2020-09-16 RX ADMIN — Medication SCH: at 12:01

## 2020-09-16 RX ADMIN — Medication SCH: at 09:16

## 2020-09-16 RX ADMIN — HYDROMORPHONE HYDROCHLORIDE PRN MG: 2 INJECTION INTRAMUSCULAR; INTRAVENOUS; SUBCUTANEOUS at 11:10

## 2020-09-16 RX ADMIN — PROPOFOL PRN MLS/HR: 10 INJECTION, EMULSION INTRAVENOUS at 14:36

## 2020-09-16 NOTE — CRITICAL CARE ADMISSION REPORT
HPI


Date:: 09/16/20


Time:: 09:00


Reason for ICU Reason:: Needs intubation for covid PNA


Admission Date/Time & PCP: 


Admission Date/Time: 09/11/20 14:17





 Primary Care Provider: 








HPI: 





This patient is a 49 yo man who was recently diagnosed with a COVID PNA. He has 

been maintained on the COVID floor with worsening oxygenation. He states his 

breathing is fine. However he is on 100% oxygen on bipap with an O2 saturation 

in the high 80s. And a PO2 of 49 by ABG. He is tachypneic and appears not to be 

tiring out although he is at high risk for decompensation and emergant 

intubation. Just before transfer to the ICU he became anxious, more tachypnic 

and dropped his oxygenation to the 80s. He was emergently intubated by 

anesthesia easily with BS-BL. Transferred directly to the ICU bed 5.


History obtained from:: Patient, Dr Up, records.





- Diagnosis/Plan


(1) Acute respiratory failure due to COVID-19


Is this a current diagnosis for this admission?: Yes   


Plan: 


This was a hypoxic failure and required intubation. He will remain intubated 

until more stable.








(2) Hypoxia


Is this a current diagnosis for this admission?: Yes   


Plan: 


We are starting him on 100% O2 and hope to titrate down.








(3) Obesity (BMI 30.0-34.9)


Is this a current diagnosis for this admission?: Yes   


Plan: 


This is an independant risk factor for complications.





Plan Summary: 





Keep intubated, start TF and wean as tolerated.





Past Medical History


Cardiac Medical History: 


   Denies: Myocardial Infarction, Hypertension


Pulmonary Medical History: 


   Denies: Asthma


Neurological Medical History: 


   Denies: Seizures


GI Medical History: 


   Denies: Hepatitis, Hiatal Hernia


Psychiatric Medical History: 


   Denies: Depression


Hematology: 


   Denies: Anemia, Sickle Cell Disease





Past Surgical History


Past Surgical History: 


   Denies: Pacemaker





Social/Family History





- Social History


Smoking Status: Never Smoker


Frequency of Alcohol Use: None


Hx Recreational Drug Use: No


Drugs: None


Hx Prescription Drug Abuse: No





- Medication/Allergies


Home Medications: 








No Home Medications  09/13/20 








Allergies/Adverse Reactions: 


                                        





No Known Allergies Allergy (Verified 10/05/17 06:50)


   











Review of Systems


ROS unobtainable: Due to endotracheal tube





Physical Exam


Vital Signs: 


                                        











Temp Pulse Resp BP Pulse Ox


 


 98.9 F   100   35 H  149/78 H  87 L


 


 09/16/20 09:07  09/16/20 08:00  09/16/20 08:49  09/16/20 08:00  09/16/20 08:49








                                 Intake & Output











 09/15/20 09/16/20 09/17/20





 06:59 06:59 06:59


 


Intake Total 1160 1810 200


 


Output Total 2525 2655 


 


Balance -1365 -845 200


 


Weight 84.9 kg 82.9 kg 








                                  Weight/Height





Weight                           82.9 kg


Height                           5 ft 2 in








General appearance: PRESENT: obese, severe distress


Head exam: PRESENT: atraumatic, normocephalic


Eye exam: PRESENT: conjunctiva pink, EOMI, PERRLA.  ABSENT: scleral icterus


Ear exam: PRESENT: normal external ear exam


Mouth exam: PRESENT: moist, tongue midline


Respiratory exam: PRESENT: accessory muscle use, clear to auscultation craig, d

ecreased breath sounds, tachypnea.  ABSENT: rales, rhonchi, wheezes


Cardiovascular exam: PRESENT: tachycardia


GI/Abdominal exam: PRESENT: normal bowel sounds, soft.  ABSENT: distended, 

guarding, mass, organolmegaly, rebound, tenderness


Rectal exam: PRESENT: deferred


Gentrourinary exam: PRESENT: indwelling catheter


Extremities exam: PRESENT: full ROM.  ABSENT: calf tenderness, clubbing, pedal 

edema


Musculoskeletal exam: PRESENT: normal inspection


Neurological exam: PRESENT: alert, awake, oriented to person, oriented to place,

oriented to time, oriented to situation, CN II-XII grossly intact.  ABSENT: 

motor sensory deficit


Psychiatric exam: PRESENT: anxious, appropriate affect, normal mood.  ABSENT: 

homicidal ideation, suicidal ideation


Skin exam: PRESENT: dry, intact, warm.  ABSENT: cyanosis, rash


Tubes/Lines: PRESENT: Endotracheal Tube, Nasogastic Tube





Laboratory/Radiographs


Laboratory Results: 


                                        





                                 09/15/20 04:42 





                                 09/15/20 04:42 





                                        











  09/16/20





  06:59


 


Carbonic Acid  1.01 L


 


HCO3/H2CO3 Ratio  22:1


 


ABG pH  7.46 H


 


ABG pCO2  33.7 L


 


ABG pO2  49.9 L


 


ABG HCO3  23.2


 


ABG O2 Saturation  87.5 L


 


ABG Base Excess  0


 


FiO2  100%








                                        





09/11/20 08:23   Blood   Blood Culture - Final


                            NO GROWTH IN 5 DAYS





                                        











  09/11/20 09/11/20





  08:23 08:23


 


Creatine Kinase  165 


 


CK-MB (CK-2)   1.07


 


Troponin I   < 0.012











Impressions: 


                                        





Abdomen Ultrasound  09/11/20 09:16


IMPRESSION:  No evidence of cholelithiasis or cholecystitis.  Nonvisualization 

of the pancreas and abdominal aorta due to obscuration by intervening bowel gas.

 Otherwise unremarkable examination.


 








Chest/Abdomen CTA  09/11/20 11:25


IMPRESSION:  1.  No evidence for acute pulmonary emboli.


2.  Extensive bilateral multifocal peripheral and parenchymal ground-glass 

opacities/consolidation and areas of consolidation in the lower lobes 

bilaterally.  Commonly reported imaging features of COVID-19 pneumonia are 

present.


3.  Mediastinal and right hilar lymphadenopathy.


3.  Additional findings as above.


 








Abdomen/Pelvis CT  09/11/20 11:26


IMPRESSION:  1.  Hepatic steatosis.


2.  Prominence of the gallbladder wall.  No gallstones.


3.  Small uncomplicated inguinal hernias.


 








Chest X-Ray  09/15/20 00:00


IMPRESSION:  Significant improvement in the appearance of the chest with what 

appears to be mild residual airspace disease.


 











All labs, radiographs, diagnostic studies and EKGs were personally reviewed: Yes


In addition, reports of radiographic and diagnostic studies were read: Yes





Critical Time


Critical Time (minutes): 40


-: 


The care of a critically ill patient is dynamic.  This note represents a static 

moment in the admission process. Orders and treatments may be given 

simultaneously and urgently, and time is not representative of the treatment 

process.





This patient requires Critical Care secondary to life threatening organ or limb 

dysfunction.  Without Critical Care services, the patient is at risk for 

increased mortality and morbidity.

## 2020-09-16 NOTE — RADIOLOGY REPORT (SQ)
EXAM DESCRIPTION:  CHEST SINGLE VIEW



IMAGES COMPLETED DATE/TIME:  9/16/2020 11:27 am



REASON FOR STUDY:  ET Tube Placement



COMPARISON:  9/15/2020



EXAM PARAMETERS:  NUMBER OF VIEWS: One view.

TECHNIQUE: Single frontal radiographic view of the chest acquired.

RADIATION DOSE: NA

LIMITATIONS: None.



FINDINGS:  LUNGS AND PLEURA: Increasing bilateral alveolar airspace disease.  Endotracheal tube is be
en placed.  The tip is in the proximal right mainstem bronchus and should be withdrawn approximately 
2.5 cm.

MEDIASTINUM AND HILAR STRUCTURES: No masses.  Contour normal.

HEART AND VASCULAR STRUCTURES: Grossly stable allowing for differences in technique.

BONES: No acute findings.

HARDWARE: None in the chest.

OTHER: No other significant finding.



IMPRESSION:  Increasing bilateral alveolar airspace disease.

Endotracheal tube is in the proximal right mainstem bronchus and should be withdrawn 2.5 cm.



COMMENT:   This report was called to the ICU at11:42 on 9/16/2020.  The tube had been repositioned.



TECHNICAL DOCUMENTATION:  JOB ID:  [id number]

 2011 [employer]- All Rights Reserved



Reading location - IP/workstation name: [email or web address]

## 2020-09-16 NOTE — PDOC PROGRESS REPORT
Subjective


Subjective:: 





Per previous physician:


"50-year-old patient presents to the ED with complaints of worsening shortness 

of breath, cough associated with blood which started about 3 days ago.  Also 

complains of abdominal pain due to coughing.  He works at a [employer].  Was 

tachypneic and hypoxic initially with a saturation of 88% which improved with 

supplemental oxygen


CT scan of the chest shows extensive bilateral multifocal peripheral and 

parenchymal groundglass opacitiesconsolidation and areas of consolidation in 

the lower lobes bilaterally.  Currently reported imaging features of COVID-19 

pneumonia are present.  There is also mediastinal and right hilar 

lymphadenopathy.  Chest x-ray shows multilobar pneumonia.  CT of the abdomen 

shows hepatic steatosis





9/13/2020-patient is on 100% CPAP at this time.  Desaturating without CPAP 

support.  covid  test came back positive.  Discussed case with the pharmacist to

start on remdesivir.. Patient is weaned to room #305.  To continue IV 

dexamethasone 6 mg IV daily.  Started on a treatment dose of Lovenox.  CRP, ESR,

d-dimer is pending at this time.  Condition is critical.  Started on IV Zosyn 

this morning.  Patient is already on Zithromax.





9/14/2020-no acute events in the last 24 hours.  Afebrile.  Patient is still on 

15 L of oxygen pulse ox 100%.  ABG was done this morning and 100% oxygen pH is 

7.43/pco2 37/PO2 61/bicarb 24, oxygen saturation is 92%.  Patient is receiving 

convulsant plasma, remidesevir, dexamethasone patient is on a treatment dose of 

Lovenox."





9/15/2020


Patient is still being maintained on 100% FiO2 on CPAP, essentially the maximum 

limits of noninvasive ventilatory support.  If patient decompensates further, he

will need to be intubated and sent to the ICU.  This was discussed with nursing 

and with the patient as well.  His chest x-ray did show significant improvement 

but clinically is not significantly improved and desaturates easily.  Bilirubin 

and CRP are higher as well as higher d-dimer and ferritin.  He is maintained on 

remdesivir, dexamethasone, treatment dose Lovenox, and antibiotics.  He has no 

new complaints today.





9/16/2020


Patient notably desaturating this morning despite 100% FiO2 on BiPAP.  I 

promptly contacted Dr. Cadet the intensivist and discussed the case with him and

placed a critical care consult.


Reason For Visit: 


COVID PNA, INTUBATED.








Physical Exam


Vital Signs: 


                                        











Temp Pulse Resp BP Pulse Ox


 


 98.9 F   100   35 H  149/78 H  70 L


 


 09/16/20 09:07  09/16/20 08:00  09/16/20 08:49  09/16/20 08:00  09/16/20 11:10








                                 Intake & Output











 09/15/20 09/16/20 09/17/20





 06:59 06:59 06:59


 


Intake Total 1160 1810 204


 


Output Total 2525 2655 


 


Balance -1365 -845 204


 


Weight 84.9 kg 82.9 kg 82.9 kg











General appearance: PRESENT: obese, severe distress, well-developed, well-

nourished


Head exam: PRESENT: atraumatic, normocephalic


Eye exam: PRESENT: conjunctiva pink


Mouth exam: PRESENT: moist


Respiratory exam: PRESENT: accessory muscle use, decreased breath sounds, 

tachypnea.  ABSENT: unlabored


Cardiovascular exam: PRESENT: tachycardia.  ABSENT: diastolic murmur, rubs, 

systolic murmur


GI/Abdominal exam: PRESENT: normal bowel sounds, soft.  ABSENT: distended, 

guarding, mass, organolmegaly, rebound, tenderness


Neurological exam: PRESENT: alert, awake, oriented to person, oriented to place,

oriented to time, oriented to situation


Psychiatric exam: PRESENT: anxious, normal mood


Skin exam: PRESENT: dry, intact, warm





Results


Laboratory Results: 


                                        





                                 09/15/20 04:42 





                                 09/15/20 04:42 





                                        











  09/16/20 09/16/20





  06:59 10:23


 


Carbonic Acid  1.01 L  Cancelled


 


HCO3/H2CO3 Ratio  22:1  Cancelled


 


ABG pH  7.46 H  Cancelled


 


ABG pCO2  33.7 L  Cancelled


 


ABG pO2  49.9 L  Cancelled


 


ABG HCO3  23.2  Cancelled


 


ABG O2 Saturation  87.5 L  Cancelled


 


ABG Base Excess  0  Cancelled


 


FiO2  100%  Cancelled








                                        





09/11/20 10:51   Blood   Blood Culture - Final


                            NO GROWTH IN 5 DAYS


09/11/20 08:23   Blood   Blood Culture - Final


                            NO GROWTH IN 5 DAYS





                                        











  09/11/20 09/11/20





  08:23 08:23


 


Creatine Kinase  165 


 


CK-MB (CK-2)   1.07


 


Troponin I   < 0.012











Impressions: 


                                        





Abdomen Ultrasound  09/11/20 09:16


IMPRESSION:  No evidence of cholelithiasis or cholecystitis.  Nonvisualization 

of the pancreas and abdominal aorta due to obscuration by intervening bowel gas.

 Otherwise unremarkable examination.


 








Chest/Abdomen CTA  09/11/20 11:25


IMPRESSION:  1.  No evidence for acute pulmonary emboli.


2.  Extensive bilateral multifocal peripheral and parenchymal ground-glass 

opacities/consolidation and areas of consolidation in the lower lobes 

bilaterally.  Commonly reported imaging features of COVID-19 pneumonia are 

present.


3.  Mediastinal and right hilar lymphadenopathy.


3.  Additional findings as above.


 








Abdomen/Pelvis CT  09/11/20 11:26


IMPRESSION:  1.  Hepatic steatosis.


2.  Prominence of the gallbladder wall.  No gallstones.


3.  Small uncomplicated inguinal hernias.


 








Chest X-Ray  09/16/20 11:05


IMPRESSION:  Increasing bilateral alveolar airspace disease.


Endotracheal tube is in the proximal right mainstem bronchus and should be with

drawn 2.5 cm.


 














Assessment and Plan





- Diagnosis


(1) Acute respiratory failure due to COVID-19


Is this a current diagnosis for this admission?: Yes   


Plan: 


Per previous physician:


"COVID test is pending however clinically patient seems to have an acute 

respiratory illness secondary to COVID


9/13/2020-covid test is positive.  To start the patient on remdesivir.  Patient 

is already on IV dexamethasone.  D-dimer, CRP, ESR is pending at this time.  

Started on a treatment dose of Lovenox.  Added IV Zosyn to Zithromax.  To 

continue CPAP with 100% oxygen.  To repeat the ABG tomorrow morning


9/14/20-patient is receiving convulsant plasma, IV dexamethasone 60 mg daily, IV

Zithromax, IV Zosyn, full dose of Lovenox, remedesivir.  Patient is comfortably 

in the bed denies any problems.  Is on CPAP 100%.  Plan is to continue the 

present management and to repeat the labs including ABG tomorrow morning."





9/15/2020


Continued on dexamethasone, treatment dose Lovenox


All cultures have been negative and there does not appear to be a bacterial 

component to his pneumonia, stop antibiotics as they recommended against in the 

setting of obvious COVID pneumonia without bacterial component


Stop Remdesivir due to rising alk phos and bilirubin in the setting of elevated

LFTs per the  recommendations for experimental use in the setting of

COVID-19 pneumonia


Continues to be maxed out on 100% FiO2 on CPAP, next up is intubation if he 

further decompensates





9/16/2022


Severe continued decompensation requiring ICU level care


Intubated after rapid response on 9/16


Critical care consult placed


Usual interventions continued








(2) Bilateral pneumonia


Qualifiers: 


   Pneumonia type: due to unspecified organism 


Is this a current diagnosis for this admission?: Yes   


Plan: 


Per previous physician:


"CT scan suggest imaging features of COVID-19, mediastinal and right hilar 

lymphadenopathy.  Follow-up chest x-ray today reveals no significant changes





9/13/2020-patient admitted with bilateral pneumonia, CT scan indicate you have 

coronavirus pneumonia.  On dexamethasone, Zithromax to start on remedies away 

today.  Covid test is positive.  Patient works in a [employer].





9/14/2020-patient is receiving IV Zosyn, IV Zithromax at this time.  Blood 

cultures are negative.  COVID test positive."





Stopped all antibiotics as cultures are negative and he clearly has COVID-19 

pneumonia








(3) Hypoxia


Is this a current diagnosis for this admission?: Yes   


Plan: 


Per previous physician:


"9/13/2020-patient admitted with acute hypoxic respiratory failure most likely 

secondary to COVID-19 pneumonia.  On 100% FiO2 on CPAP.





9/14/2020-ABG was done 100% oxygen pH is 1.43/PCO2 37/PO2 62/bicarb is 24/oxygen

saturation is 92%.  Plan is to continue CPAP and a repeat the labs tomorrow."





9/15/2022


ABG seems to be a bit improved with higher pO2


Repeat chest x-ray is improved as well


Clinically patient seems to be just is better worse than he has been previously





Decompensated








(4) Obesity (BMI 30.0-34.9)


Is this a current diagnosis for this admission?: Yes   





(5) Sepsis


Qualifiers: 


   Sepsis type: sepsis due to unspecified organism 


Is this a current diagnosis for this admission?: Yes   


Plan: 





Due to viral pneumonia and cytokine storm from COVID-19








(6) Suspected COVID-19 virus infection


Is this a current diagnosis for this admission?: Yes   





(7) Transaminitis


Is this a current diagnosis for this admission?: Yes   


Plan: 








9/15/2020


Possibly due to Remdesivir, discontinued per 's recommendations


Get portal vein PVL to rule out thrombosis








- Plan Summary


Summary: 


Patient presents to the hospital with acute respiratory failure.  He was found 

to be hypoxic with oxygen saturation of 88%, tachypneic with his respiratory 

rate as high as 37 as well as a transaminitis and bilateral lung opacities 

consistent with COVID-19.  Patient is septic secondary to COVID-19.  He received

cefepime in the emergency room however patient will be placed on Zithromax, 

dexamethasone, and supplements including zinc and vitamin D3.  I did contact 

pharmacy for initiating from this vi but at this time he has not met the 

hospitals criteria which include a positive COVID test and so we will follow-up 

with this subsequently.  Patient is also noted to have a transaminitis likely 

secondary to COVID





- Time


Total Critical Time (Minutes): 30


Medications reviewed and adjusted accordingly: Yes


Anticipated Discharge Disposition: Unknown, clinically unstable


Anticipated Discharge Timeframe: Unknown, Clinically unstable





- Inpatient Certification


Based on my medical assessment, after consideration of the patient's 

comorbidities, presenting symptoms, or acuity I expect that the services needed 

warrant INPATIENT care.: Yes


I certify that my determination is in accordance with my understanding of 

Medicare's requirements for reasonable and necessary INPATIENT services [42 CFR 

412.3e].: Yes


Medical Necessity: Significant Comorbidiites Make Outpatient Treatment Too 

Risky, Need Close Monitoring Due to Risk of Patient Decompensation, Risk of 

Complication if Not Cared For in Hospital, Risk of Diagnosis Which Will Require 

Inpatient Eval/Care/Monitoring

## 2020-09-16 NOTE — RADIOLOGY REPORT (SQ)
EXAM DESCRIPTION:  KUB/ABDOMEN (SINGLE VIEW)



IMAGES COMPLETED DATE/TIME:  9/16/2020 1:08 pm



REASON FOR STUDY:  Check Placement of NG Tube



COMPARISON:  None.



NUMBER OF VIEWS:  One view.



TECHNIQUE:   Supine radiographic image of the abdomen acquired.



LIMITATIONS:  None.



FINDINGS:  BOWEL GAS PATTERN: Normal bowel gas pattern. No dilated loops.

CALCIFICATIONS: No suspicious calcifications.

SOFT TISSUES: No gross mass or suggestion of organomegaly.

HARDWARE: NG tube is in place.

BONES: No acute fracture. No worrisome bone lesions.

OTHER: Bibasilar infiltrates are noted.



IMPRESSION:  NG tube is in place.  Gas pattern is nonspecific.



TECHNICAL DOCUMENTATION:  JOB ID:  [id number]

 2011 [employer]- All Rights Reserved



Reading location - IP/workstation name: [email or web address]

## 2020-09-17 LAB
ABSOLUTE LYMPHOCYTES# (MANUAL): 0.1 10^3/UL (ref 0.5–4.7)
ABSOLUTE MONOCYTES # (MANUAL): 0 10^3/UL (ref 0.1–1.4)
ADD MANUAL DIFF: YES
ANION GAP SERPL CALC-SCNC: 11 MMOL/L (ref 5–19)
ANION GAP SERPL CALC-SCNC: 28 MMOL/L (ref 5–19)
ANION GAP SERPL CALC-SCNC: 9 MMOL/L (ref 5–19)
ANISOCYTOSIS BLD QL SMEAR: SLIGHT
ARTERIAL BLOOD FIO2: (no result)
ARTERIAL BLOOD H2CO3: 1.57 MMOL/L (ref 1.05–1.35)
ARTERIAL BLOOD H2CO3: 1.66 MMOL/L (ref 1.05–1.35)
ARTERIAL BLOOD H2CO3: 1.78 MMOL/L (ref 1.05–1.35)
ARTERIAL BLOOD HCO3: 10.4 MMOL/L (ref 20–24)
ARTERIAL BLOOD HCO3: 21.7 MMOL/L (ref 20–24)
ARTERIAL BLOOD HCO3: 8.7 MMOL/L (ref 20–24)
ARTERIAL BLOOD PCO2: 52 MMHG (ref 35–45)
ARTERIAL BLOOD PCO2: 55 MMHG (ref 35–45)
ARTERIAL BLOOD PCO2: 59.3 MMHG (ref 35–45)
ARTERIAL BLOOD PH: 6.84 (ref 7.35–7.45)
ARTERIAL BLOOD PH: 6.86 (ref 7.35–7.45)
ARTERIAL BLOOD PH: 7.21 (ref 7.35–7.45)
ARTERIAL BLOOD PO2: 133.9 MMHG (ref 80–100)
ARTERIAL BLOOD PO2: 68.1 MMHG (ref 80–100)
ARTERIAL BLOOD PO2: 84.3 MMHG (ref 80–100)
ARTERIAL BLOOD TOTAL CO2: 10.3 MMOL/L (ref 23–27)
ARTERIAL BLOOD TOTAL CO2: 12.2 MMOL/L (ref 23–27)
ARTERIAL BLOOD TOTAL CO2: 23.3 MMOL/L (ref 23–27)
BASE EXCESS BLDA CALC-SCNC: -23.3 MMOL/L
BASE EXCESS BLDA CALC-SCNC: -25.4 MMOL/L
BASE EXCESS BLDA CALC-SCNC: -6.9 MMOL/L
BASOPHILS NFR BLD MANUAL: 0 % (ref 0–2)
BUN SERPL-MCNC: 54 MG/DL (ref 7–20)
BUN SERPL-MCNC: 59 MG/DL (ref 7–20)
BUN SERPL-MCNC: 70 MG/DL (ref 7–20)
CALCIUM: 7.1 MG/DL (ref 8.4–10.2)
CALCIUM: 7.2 MG/DL (ref 8.4–10.2)
CALCIUM: 7.2 MG/DL (ref 8.4–10.2)
CHLORIDE SERPL-SCNC: 104 MMOL/L (ref 98–107)
CHLORIDE SERPL-SCNC: 105 MMOL/L (ref 98–107)
CHLORIDE SERPL-SCNC: 107 MMOL/L (ref 98–107)
CO2 SERPL-SCNC: 10 MMOL/L (ref 22–30)
CO2 SERPL-SCNC: 23 MMOL/L (ref 22–30)
CO2 SERPL-SCNC: 23 MMOL/L (ref 22–30)
DACRYOCYTES BLD QL SMEAR: (no result)
EOSINOPHIL NFR BLD MANUAL: 0 % (ref 0–6)
ERYTHROCYTE [DISTWIDTH] IN BLOOD BY AUTOMATED COUNT: 15.4 % (ref 11.5–14)
GLUCOSE SERPL-MCNC: 174 MG/DL (ref 75–110)
GLUCOSE SERPL-MCNC: 179 MG/DL (ref 75–110)
GLUCOSE SERPL-MCNC: 192 MG/DL (ref 75–110)
HCT VFR BLD CALC: 43.7 % (ref 37.9–51)
HGB BLD-MCNC: 15.2 G/DL (ref 13.5–17)
MACROCYTES BLD QL SMEAR: (no result)
MCH RBC QN AUTO: 35.1 PG (ref 27–33.4)
MCHC RBC AUTO-ENTMCNC: 34.7 G/DL (ref 32–36)
MCV RBC AUTO: 101 FL (ref 80–97)
MONOCYTES % (MANUAL): 0 % (ref 3–13)
NEUTS BAND NFR BLD MANUAL: 2 % (ref 3–5)
NRBC BLD AUTO-RTO: 1 /100 WBC
PLATELET # BLD: 151 10^3/UL (ref 150–450)
PLATELET COMMENT: ADEQUATE
POIKILOCYTOSIS BLD QL SMEAR: SLIGHT
POTASSIUM SERPL-SCNC: 5.3 MMOL/L (ref 3.6–5)
POTASSIUM SERPL-SCNC: 5.9 MMOL/L (ref 3.6–5)
POTASSIUM SERPL-SCNC: 6 MMOL/L (ref 3.6–5)
RBC # BLD AUTO: 4.31 10^6/UL (ref 4.35–5.55)
SAO2 % BLDA: 74.7 % (ref 94–98)
SAO2 % BLDA: 85.4 % (ref 94–98)
SAO2 % BLDA: 98.1 % (ref 94–98)
SEGMENTED NEUTROPHILS % (MAN): 97 % (ref 42–78)
TOTAL CELLS COUNTED BLD: 100
VARIANT LYMPHS NFR BLD MANUAL: 1 % (ref 13–45)
WBC # BLD AUTO: 12 10^3/UL (ref 4–10.5)

## 2020-09-17 PROCEDURE — 02HV33Z INSERTION OF INFUSION DEVICE INTO SUPERIOR VENA CAVA, PERCUTANEOUS APPROACH: ICD-10-PCS

## 2020-09-17 RX ADMIN — ENOXAPARIN SODIUM SCH MG: 100 INJECTION SUBCUTANEOUS at 00:14

## 2020-09-17 RX ADMIN — HYDROMORPHONE HYDROCHLORIDE PRN MG: 2 INJECTION INTRAMUSCULAR; INTRAVENOUS; SUBCUTANEOUS at 12:34

## 2020-09-17 RX ADMIN — EPINEPHRINE PRN MLS/HR: 1 INJECTION, SOLUTION, CONCENTRATE INTRAVENOUS at 23:34

## 2020-09-17 RX ADMIN — PIPERACILLIN AND TAZOBACTAM SCH MLS/HR: 3; .375 INJECTION, POWDER, LYOPHILIZED, FOR SOLUTION INTRAVENOUS; PARENTERAL at 05:57

## 2020-09-17 RX ADMIN — SODIUM POLYSTYRENE SULFONATE SCH GM: 15 SUSPENSION ORAL; RECTAL at 22:39

## 2020-09-17 RX ADMIN — DEXAMETHASONE SODIUM PHOSPHATE SCH MG: 10 INJECTION INTRAMUSCULAR; INTRAVENOUS at 10:07

## 2020-09-17 RX ADMIN — EPINEPHRINE PRN MLS/HR: 1 INJECTION, SOLUTION, CONCENTRATE INTRAVENOUS at 19:45

## 2020-09-17 RX ADMIN — PROPOFOL PRN MLS/HR: 10 INJECTION, EMULSION INTRAVENOUS at 00:20

## 2020-09-17 RX ADMIN — PIPERACILLIN AND TAZOBACTAM SCH MLS/HR: 3; .375 INJECTION, POWDER, LYOPHILIZED, FOR SOLUTION INTRAVENOUS; PARENTERAL at 00:10

## 2020-09-17 RX ADMIN — Medication SCH: at 21:07

## 2020-09-17 RX ADMIN — Medication SCH: at 15:05

## 2020-09-17 RX ADMIN — PROPOFOL PRN MLS/HR: 10 INJECTION, EMULSION INTRAVENOUS at 16:08

## 2020-09-17 RX ADMIN — SODIUM BICARBONATE PRN MLS/HR: 84 INJECTION INTRAVENOUS at 23:34

## 2020-09-17 RX ADMIN — ASPIRIN SCH MG: 81 TABLET, COATED ORAL at 00:20

## 2020-09-17 RX ADMIN — PIPERACILLIN AND TAZOBACTAM SCH MLS/HR: 2; .25 INJECTION, POWDER, LYOPHILIZED, FOR SOLUTION INTRAVENOUS; PARENTERAL at 19:08

## 2020-09-17 RX ADMIN — Medication SCH MG: at 10:07

## 2020-09-17 RX ADMIN — VITAMIN D, TAB 1000IU (100/BT) SCH UNIT: 25 TAB at 10:07

## 2020-09-17 RX ADMIN — PROPOFOL PRN MLS/HR: 10 INJECTION, EMULSION INTRAVENOUS at 11:12

## 2020-09-17 RX ADMIN — PROPOFOL PRN MLS/HR: 10 INJECTION, EMULSION INTRAVENOUS at 08:26

## 2020-09-17 RX ADMIN — Medication SCH: at 05:54

## 2020-09-17 RX ADMIN — AZITHROMYCIN MONOHYDRATE SCH MLS/HR: 500 INJECTION, POWDER, LYOPHILIZED, FOR SOLUTION INTRAVENOUS at 10:35

## 2020-09-17 RX ADMIN — PANTOPRAZOLE SODIUM SCH MG: 40 INJECTION, POWDER, FOR SOLUTION INTRAVENOUS at 10:07

## 2020-09-17 RX ADMIN — PROPOFOL PRN MLS/HR: 10 INJECTION, EMULSION INTRAVENOUS at 04:50

## 2020-09-17 RX ADMIN — HYDROMORPHONE HYDROCHLORIDE PRN MG: 2 INJECTION INTRAMUSCULAR; INTRAVENOUS; SUBCUTANEOUS at 00:23

## 2020-09-17 RX ADMIN — PIPERACILLIN AND TAZOBACTAM SCH MLS/HR: 2; .25 INJECTION, POWDER, LYOPHILIZED, FOR SOLUTION INTRAVENOUS; PARENTERAL at 12:24

## 2020-09-17 RX ADMIN — INSULIN HUMAN SCH UNIT: 100 INJECTION, SOLUTION PARENTERAL at 12:37

## 2020-09-17 RX ADMIN — SODIUM POLYSTYRENE SULFONATE SCH GM: 15 SUSPENSION ORAL; RECTAL at 10:07

## 2020-09-17 RX ADMIN — ASPIRIN SCH: 81 TABLET, COATED ORAL at 21:07

## 2020-09-17 RX ADMIN — ENOXAPARIN SODIUM SCH MG: 100 INJECTION SUBCUTANEOUS at 10:08

## 2020-09-17 RX ADMIN — Medication SCH: at 00:20

## 2020-09-17 RX ADMIN — SODIUM POLYSTYRENE SULFONATE SCH: 15 SUSPENSION ORAL; RECTAL at 22:43

## 2020-09-17 RX ADMIN — INSULIN HUMAN SCH: 100 INJECTION, SOLUTION PARENTERAL at 18:56

## 2020-09-17 RX ADMIN — NOREPINEPHRINE BITARTRATE PRN MLS/HR: 1 INJECTION, SOLUTION, CONCENTRATE INTRAVENOUS at 21:08

## 2020-09-17 NOTE — RADIOLOGY REPORT (SQ)
EXAM DESCRIPTION:  CHEST SINGLE VIEW



IMAGES COMPLETED DATE/TIME:  9/17/2020 6:19 pm



REASON FOR STUDY:  S/P code, pulmonary hemorrhage.



COMPARISON:  None.



EXAM PARAMETERS:  NUMBER OF VIEWS: One view.

TECHNIQUE: Single frontal radiographic view of the chest acquired.

RADIATION DOSE: NA

LIMITATIONS: None.



FINDINGS:  LUNGS AND PLEURA: There appears to be alveolar disease in the right lung more than left.

MEDIASTINUM AND HILAR STRUCTURES: No masses.  Contour normal.

HEART AND VASCULAR STRUCTURES: Heart normal in size.  Normal vasculature.

BONES: No acute findings.

HARDWARE: Endotracheal tube has its tip right at the andres.  NG tube extends to the stomach.

OTHER: No other significant finding.



IMPRESSION:  Airspace disease in the right lung more than left.  May represent pulmonary edema.  Neeraj
ot exclude pulmonary hemorrhage.  Endotracheal tube as described.



COMMENT:  Discussed the endotracheal tube position with the ICU nurse.



TECHNICAL DOCUMENTATION:  JOB ID:  [id number]

 2011 [employer]- All Rights Reserved



Reading location - IP/workstation name: [email or web address]

## 2020-09-17 NOTE — PROGRESS NOTE
Provider Note


Provider Note: 





Called to cardiac arrest at about 1700. Patient suddenly arrested in a PEA 

arrest by the time I got there. ACLS protocol followed for several rounds. ROSC 

obtained with cuff BP of 112/62.  likely due to epinephrine. During CPR pt

was manually bagged with AMBU bag. Much blood seen and suctioned out. My impress

ion is a pulmonary hemmorhage due to COVID that led to an episode of hypoxia 

that caused the arrest. CXR pending to assess for bleeding. Fiance coming in. 

Prognosis not good. Mirtha attempted in R femoral artery and R axillary without 

success.

## 2020-09-17 NOTE — PDOC CONSULTATION
Consultation


Consult Date: 09/17/20


Provider Consulted: LEYDA FLORES


Consult reason:: oliguric ROCKY with hyperkalemia





History of Present Illness


Admission Date/PCP: 


  09/11/20 14:17





  





History of Present Illness: 


CASSIDY TATE is being seen in the ICU today who is currently intubated and 

sedated.  Therefore chart review was done and discussions were done with the 

intensivist.  This patient is a 50-year-old patient presented to the ED with 

complaints of worsening shortness of breath, cough associated with blood which 

started about 3 days prior to admission.  Also complained of abdominal pain due 

to coughing.  He works at a [employer].  Was tachypneic and hypoxic initially 

with a saturation of 88% which improved with supplemental oxygen


CT scan of the chest showed extensive bilateral multifocal peripheral and 

parenchymal groundglass opacitiesconsolidation and areas of consolidation in 

the lower lobes bilaterally.  Currently reported imaging features of COVID-19 

pneumonia are present.  There is also mediastinal and right hilar 

lymphadenopathy.  Chest x-ray shows multilobar pneumonia.  CT of the abdomen 

shows hepatic steatosis.





Patient was initially admitted to the floor but his breathing got more 

embarrassing and had to be urgently intubated yesterday and transferred to the 

ICU. Patient is receiving convulsant plasma, remidesevir, dexamethasone and 

Lovenox.Furthermore his urine output recently began to drop and his creatinine 

has begun to rise along with hyperkalemia and acidosis.








Past Medical History


Cardiac Medical History: 


   Denies: Myocardial Infarction


Pulmonary Medical History: 


   Denies: Asthma


Neurological Medical History: 


   Denies: Seizures


GI Medical History: 


   Denies: Hepatitis, Hiatal Hernia


Psychiatric Medical History: 


   Denies: Depression





Past Surgical History


Past Surgical History: 


   Denies: Pacemaker





Social History


Smoking Status: Never Smoker


Frequency of Alcohol Use: None


Hx Recreational Drug Use: No


Drugs: None


Hx Prescription Drug Abuse: No





- Advance Directive


Resuscitation Status: Full Code





Family History


Parental Family History Reviewed: No - Patient intubated and sedated.


Children Family History Reviewed: No


Sibling(s) Family History Reviewed.: No





Medication/Allergy


Home Medications: 








No Home Medications  09/13/20 








Allergies/Adverse Reactions: 


                                        





No Known Allergies Allergy (Verified 10/05/17 06:50)


   











Review of Systems


ROS unobtainable: Due to endotracheal tube - Chart review was done.  Discussions

were done with the treating nurse.





Physical Exam


Vital Signs: 


                                        











Temp Pulse Resp BP Pulse Ox


 


 99.5 F   109 H  18   102/69   95 


 


 09/17/20 12:00  09/17/20 12:00  09/17/20 12:00  09/17/20 12:00  09/17/20 12:00








                                 Intake & Output











 09/16/20 09/17/20 09/18/20





 06:59 06:59 06:59


 


Intake Total 1810 762 414


 


Output Total 2655 553 225


 


Balance -845 209 189


 


Weight 82.9 kg 78.8 kg 











Exam: 


Patient currently intubated and sedated.





Eye exam: PRESENT: EOMI, PERRLA.  ABSENT: scleral icterus


Respiratory exam: PRESENT: clear to auscultation craig.  ABSENT: crackles


Cardiovascular exam: PRESENT: +S1, +S2


GI/Abdominal exam: PRESENT: normal bowel sounds, soft.  ABSENT: organomegaly


Extremities exam: ABSENT: pedal edema





Results


Laboratory Results: 


                                        





                                 09/17/20 03:50 





                                 09/17/20 05:42 





                                        











  09/15/20 09/16/20 09/17/20





  04:42 16:45 03:50


 


WBC    12.0 H


 


RBC    4.31 L


 


Hgb    15.2


 


Hct    43.7


 


MCV    101 H


 


MCH    35.1 H


 


MCHC    34.7


 


RDW    15.4 H


 


Plt Count    151


 


Seg Neutrophils %    Not Reportable


 


Carbonic Acid   1.38 H 


 


HCO3/H2CO3 Ratio   13:1 


 


ABG pH   7.23 L 


 


ABG pCO2   45.7 H 


 


ABG pO2   52.3 L 


 


ABG HCO3   18.6 L 


 


ABG O2 Saturation   80.4 L 


 


ABG Base Excess   -8.9 


 


FiO2   100% 


 


Sodium   


 


Potassium   


 


Chloride   


 


Carbon Dioxide   


 


Anion Gap   


 


BUN   


 


Creatinine   


 


Est GFR (African Amer)   


 


Glucose   


 


Calcium   


 


Triglycerides  124  














  09/17/20 09/17/20 09/17/20





  03:50 04:23 05:42


 


WBC   


 


RBC   


 


Hgb   


 


Hct   


 


MCV   


 


MCH   


 


MCHC   


 


RDW   


 


Plt Count   


 


Seg Neutrophils %   


 


Carbonic Acid   1.66 H 


 


HCO3/H2CO3 Ratio   13:1 


 


ABG pH   7.21 L 


 


ABG pCO2   55.0 H 


 


ABG pO2   133.9 H 


 


ABG HCO3   21.7 


 


ABG O2 Saturation   98.1 H 


 


ABG Base Excess   -6.9 


 


FiO2   100% 


 


Sodium  139.0   138.6


 


Potassium  6.0 H*   5.9 H


 


Chloride  107   105


 


Carbon Dioxide  23   23


 


Anion Gap  9   11


 


BUN  54 H   59 H


 


Creatinine  2.40 H   2.78 H


 


Est GFR ( Amer)  35 L   30 L


 


Glucose  174 H   179 H


 


Calcium  7.1 L   7.2 L


 


Triglycerides   








                                        





09/11/20 10:51   Blood   Blood Culture - Final


                            NO GROWTH IN 5 DAYS





                                        











  09/11/20 09/11/20





  08:23 08:23


 


Creatine Kinase  165 


 


CK-MB (CK-2)   1.07


 


Troponin I   < 0.012











Impressions: 


                                        





Abdomen Ultrasound  09/11/20 09:16


IMPRESSION:  No evidence of cholelithiasis or cholecystitis.  Nonvisualization 

of the pancreas and abdominal aorta due to obscuration by intervening bowel gas.

 Otherwise unremarkable examination.


 








Chest/Abdomen CTA  09/11/20 11:25


IMPRESSION:  1.  No evidence for acute pulmonary emboli.


2.  Extensive bilateral multifocal peripheral and parenchymal ground-glass 

opacities/consolidation and areas of consolidation in the lower lobes bila

terally.  Commonly reported imaging features of COVID-19 pneumonia are present.


3.  Mediastinal and right hilar lymphadenopathy.


3.  Additional findings as above.


 








Abdomen/Pelvis CT  09/11/20 11:26


IMPRESSION:  1.  Hepatic steatosis.


2.  Prominence of the gallbladder wall.  No gallstones.


3.  Small uncomplicated inguinal hernias.


 








KUB X-Ray  09/16/20 10:43


IMPRESSION:  NG tube is in place.  Gas pattern is nonspecific.


 








Chest X-Ray  09/17/20 06:00


IMPRESSION:  Tubes and lines as above.  Otherwise unchanged radiographic 

appearance of the chest.


 














Assessment & Plan





- Diagnosis


(1) ARF (acute renal failure)


Qualifiers: 


   Acute renal failure type: with acute tubular necrosis   Qualified Code(s): 

N17.0 - Acute kidney failure with tubular necrosis   


Is this a current diagnosis for this admission?: Yes   


Plan: 


This is a patient with Covid pneumonia and respiratory failure now intubated and

sedated showing acute kidney insult with decreasing urine output along with 

hyperkalemia and acidosis.  Unfortunately it looks like the patient might 

progress into worsening renal failure that may require dialysis in the next 24-

48 hours.  Discussed this with Dr. Escobedo/intensivist about placing of a temporary

femoral dialysis catheter early in the morning if his urine output has dropped 

further along with a concomitant rise of his renal numbers.  Given the weekend 

coming,  it would be the best option to initiate dialysis tomorrow if that is 

the case.  Currently no acute indications for renal replacements.








(2) Acute respiratory failure due to COVID-19


Is this a current diagnosis for this admission?: Yes   


Plan: 


On appropriate medications and being managed by Dr. Escobedo/intensivist.








(3) Bilateral pneumonia


Qualifiers: 


   Pneumonia type: due to unspecified organism 


Is this a current diagnosis for this admission?: Yes   


Plan: 


Secondary to covid pneumonia.








(4) Hyperkalemia


Plan: 


Recommend treating with Kayexalate for now.  Discussed with Dr. Escobedo/in

tensivist.








(5) Obesity (BMI 30.0-34.9)


Is this a current diagnosis for this admission?: Yes   


Plan: 


Status quo.

## 2020-09-17 NOTE — PDOC CRITICAL CARE PROG REPORT
General


Date:: 09/17/20


ICU Day:: 2


Ventilator Day:: 2


Hospital Day:: 6


Resuscitation Status: Full Code


Events in the past 12 to 24 Hours:: 





Remains more stable but much improved.


Review of systems relevant to events:: 





Pulmonary, renal.


Reason for ICU Addmission:: Needs intubation for covid PNA





- Medications:


Medications reviewed and adjusted accordingly: Yes


Vasopressors:: 





None


Sedation:: 





Diprivan





Physical Exam


Vital Signs: 


                                        











Temp Pulse Resp BP Pulse Ox


 


 99.1 F   117 H  25 H  115/74   96 


 


 09/17/20 07:57  09/17/20 07:57  09/17/20 07:57  09/17/20 07:57  09/17/20 07:57








                                 Intake & Output











 09/16/20 09/17/20 09/18/20





 06:59 06:59 06:59


 


Intake Total 1810 512 90


 


Output Total 2655 553 65


 


Balance -845 -41 25


 


Weight 82.9 kg 78.8 kg 








                                  Weight/Height





Weight                           78.8 kg


Height                           5 ft 2 in








General appearance: PRESENT: no acute distress, obese


Head exam: PRESENT: atraumatic, normocephalic


Eye exam: PRESENT: conjunctiva pink, EOMI, PERRLA.  ABSENT: scleral icterus


Ear exam: PRESENT: normal external ear exam


Mouth exam: PRESENT: moist, tongue midline


Respiratory exam: PRESENT: clear to auscultation craig, decreased breath sounds.  

ABSENT: rales, rhonchi, wheezes


Cardiovascular exam: PRESENT: RRR, tachycardia.  ABSENT: diastolic murmur, rubs,

systolic murmur


GI/Abdominal exam: PRESENT: normal bowel sounds, soft.  ABSENT: distended, 

guarding, mass, organolmegaly, rebound, tenderness


Rectal exam: PRESENT: deferred


Gentrourinary exam: PRESENT: indwelling catheter, other - Penis positioned while

prone with no kinks, twists or pressure.


Extremities exam: PRESENT: full ROM.  ABSENT: calf tenderness, clubbing, pedal 

edema


Musculoskeletal exam: PRESENT: normal inspection


Neurological exam: PRESENT: other - Sedated.


Skin exam: PRESENT: dry, intact, warm.  ABSENT: cyanosis, rash


Tubes/Lines: PRESENT: Endotracheal Tube, Nasogastic Tube





Laboratory/Radiographs


Laboratory Results: 


                                        





                                 09/17/20 03:50 





                                 09/17/20 05:42 





                                        











  09/15/20 09/16/20 09/16/20





  04:42 10:23 16:45


 


WBC   


 


RBC   


 


Hgb   


 


Hct   


 


MCV   


 


MCH   


 


MCHC   


 


RDW   


 


Plt Count   


 


Seg Neutrophils %   


 


Carbonic Acid   Cancelled  1.38 H


 


HCO3/H2CO3 Ratio   Cancelled  13:1


 


ABG pH   Cancelled  7.23 L


 


ABG pCO2   Cancelled  45.7 H


 


ABG pO2   Cancelled  52.3 L


 


ABG HCO3   Cancelled  18.6 L


 


ABG O2 Saturation   Cancelled  80.4 L


 


ABG Base Excess   Cancelled  -8.9


 


FiO2   Cancelled  100%


 


Sodium   


 


Potassium   


 


Chloride   


 


Carbon Dioxide   


 


Anion Gap   


 


BUN   


 


Creatinine   


 


Est GFR (African Amer)   


 


Glucose   


 


Calcium   


 


Triglycerides  124  














  09/17/20 09/17/20 09/17/20





  03:50 03:50 04:23


 


WBC  12.0 H  


 


RBC  4.31 L  


 


Hgb  15.2  


 


Hct  43.7  


 


MCV  101 H  


 


MCH  35.1 H  


 


MCHC  34.7  


 


RDW  15.4 H  


 


Plt Count  151  


 


Seg Neutrophils %  Not Reportable  


 


Carbonic Acid    1.66 H


 


HCO3/H2CO3 Ratio    13:1


 


ABG pH    7.21 L


 


ABG pCO2    55.0 H


 


ABG pO2    133.9 H


 


ABG HCO3    21.7


 


ABG O2 Saturation    98.1 H


 


ABG Base Excess    -6.9


 


FiO2    100%


 


Sodium   139.0 


 


Potassium   6.0 H* 


 


Chloride   107 


 


Carbon Dioxide   23 


 


Anion Gap   9 


 


BUN   54 H 


 


Creatinine   2.40 H 


 


Est GFR ( Amer)   35 L 


 


Glucose   174 H 


 


Calcium   7.1 L 


 


Triglycerides   














  09/17/20





  05:42


 


WBC 


 


RBC 


 


Hgb 


 


Hct 


 


MCV 


 


MCH 


 


MCHC 


 


RDW 


 


Plt Count 


 


Seg Neutrophils % 


 


Carbonic Acid 


 


HCO3/H2CO3 Ratio 


 


ABG pH 


 


ABG pCO2 


 


ABG pO2 


 


ABG HCO3 


 


ABG O2 Saturation 


 


ABG Base Excess 


 


FiO2 


 


Sodium  138.6


 


Potassium  5.9 H


 


Chloride  105


 


Carbon Dioxide  23


 


Anion Gap  11


 


BUN  59 H


 


Creatinine  2.78 H


 


Est GFR (African Amer)  30 L


 


Glucose  179 H


 


Calcium  7.2 L


 


Triglycerides 








                                        





09/11/20 10:51   Blood   Blood Culture - Final


                            NO GROWTH IN 5 DAYS


09/11/20 08:23   Blood   Blood Culture - Final


                            NO GROWTH IN 5 DAYS





                                        











  09/11/20 09/11/20





  08:23 08:23


 


Creatine Kinase  165 


 


CK-MB (CK-2)   1.07


 


Troponin I   < 0.012











Impressions: 


                                        





Abdomen Ultrasound  09/11/20 09:16


IMPRESSION:  No evidence of cholelithiasis or cholecystitis.  Nonvisualization 

of the pancreas and abdominal aorta due to obscuration by intervening bowel gas.

 Otherwise unremarkable examination.


 








Chest/Abdomen CTA  09/11/20 11:25


IMPRESSION:  1.  No evidence for acute pulmonary emboli.


2.  Extensive bilateral multifocal peripheral and parenchymal ground-glass 

opacities/consolidation and areas of consolidation in the lower lobes 

bilaterally.  Commonly reported imaging features of COVID-19 pneumonia are 

present.


3.  Mediastinal and right hilar lymphadenopathy.


3.  Additional findings as above.


 








Abdomen/Pelvis CT  09/11/20 11:26


IMPRESSION:  1.  Hepatic steatosis.


2.  Prominence of the gallbladder wall.  No gallstones.


3.  Small uncomplicated inguinal hernias.


 








KUB X-Ray  09/16/20 10:43


IMPRESSION:  NG tube is in place.  Gas pattern is nonspecific.


 











All labs, radiographs, diagnostic studies and EKGs were personally reviewed: Yes


In addition, reports of radiographic and diagnostic studies were read: Yes





Assessment and Plan





- Diagnosis


(1) Acute respiratory failure due to COVID-19


Is this a current diagnosis for this admission?: Yes   


Plan: 


Still requiring vent support at a lower level but still high. Plan to keep 

proned 16 hours and supine 8.








(2) Hypoxia


Is this a current diagnosis for this admission?: Yes   


Plan: 


Still needing vent support.








(3) Obesity (BMI 30.0-34.9)


Is this a current diagnosis for this admission?: Yes   


Plan: 


Mild but still a risk factor.








(4) ARF (acute renal failure)


Qualifiers: 


   Acute renal failure type: with acute tubular necrosis   Qualified Code(s): 

N17.0 - Acute kidney failure with tubular necrosis   


Is this a current diagnosis for this admission?: Yes   


Plan: 


Probably ATN. With his worsening of renal function with GFR 24, Cr 2.7 and 

potassium of 5.9 will ask Dr. Wren to consult for possible need for HD.





Plan Summary: 





Keep vented. Try supine after 16 hours. Kayexalate ordered. I dont think bicarb 

drip is needed.





Critical Time


Critical Time (minutes): 35


Level of Care: ICU


Anticipated discharge: SNF


Anticipated DC Timeframe: Other


-: 


1.  The care of a critical patient is a dynamic process.  This note is a 

representative synopsis but static in nature.  The timeframe for treatments 

given in order is not necessarily the actual time these treatments may have been

done.





2.  This patient requires critical care secondary to ongoing requirements for 

therapy not offered or safe outside the critical care environment.  Transfer to 

a lower level of care will result in altered life or limb morbidity and 

mortality.





3.  Multidisciplinary rounds completed.





4.  ABCDE bundle addressed.

## 2020-09-17 NOTE — RADIOLOGY REPORT (SQ)
EXAM DESCRIPTION:  CHEST SINGLE VIEW



IMAGES COMPLETED DATE/TIME:  9/17/2020 6:45 am



REASON FOR STUDY:  Just intubated.



COMPARISON:  AP view of the chest from 9/16/2020.



EXAM PARAMETERS:  NUMBER OF VIEWS: One view.

TECHNIQUE:  An AP view of the chest was obtained.

RADIATION DOSE: NA

LIMITATIONS: None.



FINDINGS:  LUNGS AND PLEURA: Unchanged diffuse alveolar opacities.

MEDIASTINUM AND HILAR STRUCTURES: Stable mediastinal and hilar contours.

HEART AND VASCULAR STRUCTURES: Stable cardiac silhouette.

BONES: No acute findings.

HARDWARE: The tip of the endotracheal tube projects 5.3 cm above the andres.  The tip and side hole o
f the enteric tube projects within the gastric lumen.

OTHER: No other findings.



IMPRESSION:  Tubes and lines as above.  Otherwise unchanged radiographic appearance of the chest.



TECHNICAL DOCUMENTATION:  JOB ID:  [id number]

 2011 [employer]- All Rights Reserved



Reading location - IP/workstation name: [email or web address]

## 2020-09-18 LAB
ABSOLUTE LYMPHOCYTES# (MANUAL): 1.2 10^3/UL (ref 0.5–4.7)
ABSOLUTE MONOCYTES # (MANUAL): 0.9 10^3/UL (ref 0.1–1.4)
ADD MANUAL DIFF: YES
ALBUMIN SERPL-MCNC: 1.8 G/DL (ref 3.5–5)
ALBUMIN SERPL-MCNC: 2 G/DL (ref 3.5–5)
ALBUMIN SERPL-MCNC: 2.7 G/DL (ref 3.5–5)
ALP SERPL-CCNC: 1493 U/L (ref 38–126)
ALP SERPL-CCNC: 303 U/L (ref 38–126)
ALP SERPL-CCNC: 510 U/L (ref 38–126)
ANION GAP SERPL CALC-SCNC: 19 MMOL/L (ref 5–19)
ANION GAP SERPL CALC-SCNC: 28 MMOL/L (ref 5–19)
ANION GAP SERPL CALC-SCNC: 29 MMOL/L (ref 5–19)
ANION GAP SERPL CALC-SCNC: 32 MMOL/L (ref 5–19)
ANISOCYTOSIS BLD QL SMEAR: (no result)
ARTERIAL BLOOD FIO2: (no result)
ARTERIAL BLOOD FIO2: 100
ARTERIAL BLOOD FIO2: 100
ARTERIAL BLOOD H2CO3: 1.54 MMOL/L (ref 1.05–1.35)
ARTERIAL BLOOD H2CO3: 1.87 MMOL/L (ref 1.05–1.35)
ARTERIAL BLOOD H2CO3: 1.93 MMOL/L (ref 1.05–1.35)
ARTERIAL BLOOD HCO3: 10.1 MMOL/L (ref 20–24)
ARTERIAL BLOOD HCO3: 11.1 MMOL/L (ref 20–24)
ARTERIAL BLOOD HCO3: 20.3 MMOL/L (ref 20–24)
ARTERIAL BLOOD PCO2: 51.2 MMHG (ref 35–45)
ARTERIAL BLOOD PCO2: 62.1 MMHG (ref 35–45)
ARTERIAL BLOOD PCO2: 64.1 MMHG (ref 35–45)
ARTERIAL BLOOD PH: 6.87 (ref 7.35–7.45)
ARTERIAL BLOOD PH: 6.92 (ref 7.35–7.45)
ARTERIAL BLOOD PH: 7.12 (ref 7.35–7.45)
ARTERIAL BLOOD PO2: 27.9 MMHG (ref 80–100)
ARTERIAL BLOOD PO2: 67.2 MMHG (ref 80–100)
ARTERIAL BLOOD PO2: 74.2 MMHG (ref 80–100)
ARTERIAL BLOOD TOTAL CO2: 11.7 MMOL/L (ref 23–27)
ARTERIAL BLOOD TOTAL CO2: 13 MMOL/L (ref 23–27)
ARTERIAL BLOOD TOTAL CO2: 22.3 MMOL/L (ref 23–27)
AST SERPL-CCNC: (no result) U/L (ref 17–59)
AST SERPL-CCNC: 1260 U/L (ref 17–59)
AST SERPL-CCNC: 5828 U/L (ref 17–59)
BASE EXCESS BLDA CALC-SCNC: -22.2 MMOL/L
BASE EXCESS BLDA CALC-SCNC: -22.4 MMOL/L
BASE EXCESS BLDA CALC-SCNC: -9.5 MMOL/L
BASOPHILS NFR BLD MANUAL: 0 % (ref 0–2)
BILIRUB DIRECT SERPL-MCNC: 3.6 MG/DL (ref 0–0.4)
BILIRUB DIRECT SERPL-MCNC: 4.2 MG/DL (ref 0–0.4)
BILIRUB DIRECT SERPL-MCNC: 5.7 MG/DL (ref 0–0.4)
BILIRUB SERPL-MCNC: 4.1 MG/DL (ref 0.2–1.3)
BILIRUB SERPL-MCNC: 5.5 MG/DL (ref 0.2–1.3)
BILIRUB SERPL-MCNC: 8.4 MG/DL (ref 0.2–1.3)
BUN SERPL-MCNC: 38 MG/DL (ref 7–20)
BUN SERPL-MCNC: 66 MG/DL (ref 7–20)
CALCIUM: 5.1 MG/DL (ref 8.4–10.2)
CALCIUM: 5.6 MG/DL (ref 8.4–10.2)
CALCIUM: 6 MG/DL (ref 8.4–10.2)
CALCIUM: 6.6 MG/DL (ref 8.4–10.2)
CHLORIDE SERPL-SCNC: 101 MMOL/L (ref 98–107)
CHLORIDE SERPL-SCNC: 86 MMOL/L (ref 98–107)
CHLORIDE SERPL-SCNC: 90 MMOL/L (ref 98–107)
CHLORIDE SERPL-SCNC: 93 MMOL/L (ref 98–107)
CO2 SERPL-SCNC: 12 MMOL/L (ref 22–30)
CO2 SERPL-SCNC: 12 MMOL/L (ref 22–30)
CO2 SERPL-SCNC: 16 MMOL/L (ref 22–30)
CO2 SERPL-SCNC: 24 MMOL/L (ref 22–30)
EOSINOPHIL NFR BLD MANUAL: 0 % (ref 0–6)
ERYTHROCYTE [DISTWIDTH] IN BLOOD BY AUTOMATED COUNT: 18.1 % (ref 11.5–14)
GLUCOSE SERPL-MCNC: 220 MG/DL (ref 75–110)
GLUCOSE SERPL-MCNC: 312 MG/DL (ref 75–110)
GLUCOSE SERPL-MCNC: 423 MG/DL (ref 75–110)
GLUCOSE SERPL-MCNC: 544 MG/DL (ref 75–110)
HCT VFR BLD CALC: 34.6 % (ref 37.9–51)
HGB BLD-MCNC: 10.6 G/DL (ref 13.5–17)
MACROCYTES BLD QL SMEAR: (no result)
MCH RBC QN AUTO: 34.2 PG (ref 27–33.4)
MCHC RBC AUTO-ENTMCNC: 30.6 G/DL (ref 32–36)
MCV RBC AUTO: 112 FL (ref 80–97)
METAMYELOCYTES % (MANUAL): 1 % (ref 0–1)
MONOCYTES % (MANUAL): 4 % (ref 3–13)
NEUTS BAND NFR BLD MANUAL: 3 % (ref 3–5)
NRBC BLD AUTO-RTO: 1 /100 WBC
PAPPENHEIMER BODIES: PRESENT
PATH REV BLD -IMP: (no result)
PLATELET # BLD: 120 10^3/UL (ref 150–450)
PLATELET COMMENT: (no result)
POLYCHROMASIA BLD QL SMEAR: (no result)
POTASSIUM SERPL-SCNC: 4 MMOL/L (ref 3.6–5)
POTASSIUM SERPL-SCNC: 4.5 MMOL/L (ref 3.6–5)
POTASSIUM SERPL-SCNC: 4.6 MMOL/L (ref 3.6–5)
POTASSIUM SERPL-SCNC: 5.1 MMOL/L (ref 3.6–5)
PROT SERPL-MCNC: 5.1 G/DL (ref 6.3–8.2)
PROT SERPL-MCNC: 5.9 G/DL (ref 6.3–8.2)
PROT SERPL-MCNC: 7.3 G/DL (ref 6.3–8.2)
RBC # BLD AUTO: 3.09 10^6/UL (ref 4.35–5.55)
SAO2 % BLDA: 35 % (ref 94–98)
SAO2 % BLDA: 77.7 % (ref 94–98)
SAO2 % BLDA: 80.2 % (ref 94–98)
SEGMENTED NEUTROPHILS % (MAN): 87 % (ref 42–78)
TOTAL CELLS COUNTED BLD: 100
VARIANT LYMPHS NFR BLD MANUAL: 5 % (ref 13–45)
WBC # BLD AUTO: 23.2 10^3/UL (ref 4–10.5)
WBC TOXIC VACUOLES BLD QL SMEAR: PRESENT

## 2020-09-18 PROCEDURE — 06HM33Z INSERTION OF INFUSION DEVICE INTO RIGHT FEMORAL VEIN, PERCUTANEOUS APPROACH: ICD-10-PCS | Performed by: SURGERY

## 2020-09-18 RX ADMIN — NOREPINEPHRINE BITARTRATE PRN MLS/HR: 1 INJECTION, SOLUTION, CONCENTRATE INTRAVENOUS at 21:45

## 2020-09-18 RX ADMIN — CALCIUM GLUCONATE SCH MLS/HR: 20 INJECTION, SOLUTION INTRAVENOUS at 18:45

## 2020-09-18 RX ADMIN — PANTOPRAZOLE SODIUM SCH MG: 40 INJECTION, POWDER, FOR SOLUTION INTRAVENOUS at 10:25

## 2020-09-18 RX ADMIN — EPINEPHRINE PRN MLS/HR: 1 INJECTION, SOLUTION, CONCENTRATE INTRAVENOUS at 22:09

## 2020-09-18 RX ADMIN — INSULIN HUMAN SCH: 100 INJECTION, SOLUTION PARENTERAL at 06:05

## 2020-09-18 RX ADMIN — EPINEPHRINE PRN MLS/HR: 1 INJECTION, SOLUTION, CONCENTRATE INTRAVENOUS at 17:17

## 2020-09-18 RX ADMIN — VITAMIN D, TAB 1000IU (100/BT) SCH UNIT: 25 TAB at 10:25

## 2020-09-18 RX ADMIN — EPINEPHRINE PRN MLS/HR: 1 INJECTION, SOLUTION, CONCENTRATE INTRAVENOUS at 01:00

## 2020-09-18 RX ADMIN — EPINEPHRINE PRN MLS/HR: 1 INJECTION, SOLUTION, CONCENTRATE INTRAVENOUS at 23:23

## 2020-09-18 RX ADMIN — NOREPINEPHRINE BITARTRATE PRN MLS/HR: 1 INJECTION, SOLUTION, CONCENTRATE INTRAVENOUS at 03:40

## 2020-09-18 RX ADMIN — CALCIUM GLUCONATE SCH MLS/HR: 20 INJECTION, SOLUTION INTRAVENOUS at 17:00

## 2020-09-18 RX ADMIN — EPINEPHRINE PRN MLS/HR: 1 INJECTION, SOLUTION, CONCENTRATE INTRAVENOUS at 13:39

## 2020-09-18 RX ADMIN — CALCIUM GLUCONATE SCH MLS/HR: 20 INJECTION, SOLUTION INTRAVENOUS at 15:42

## 2020-09-18 RX ADMIN — EPINEPHRINE PRN MLS/HR: 1 INJECTION, SOLUTION, CONCENTRATE INTRAVENOUS at 15:28

## 2020-09-18 RX ADMIN — ASPIRIN SCH: 81 TABLET, COATED ORAL at 22:06

## 2020-09-18 RX ADMIN — PIPERACILLIN AND TAZOBACTAM SCH MLS/HR: 2; .25 INJECTION, POWDER, LYOPHILIZED, FOR SOLUTION INTRAVENOUS; PARENTERAL at 15:42

## 2020-09-18 RX ADMIN — INSULIN HUMAN PRN MLS/HR: 100 INJECTION, SOLUTION PARENTERAL at 08:56

## 2020-09-18 RX ADMIN — INSULIN HUMAN SCH UNIT: 100 INJECTION, SOLUTION PARENTERAL at 01:00

## 2020-09-18 RX ADMIN — Medication SCH: at 22:06

## 2020-09-18 RX ADMIN — SODIUM BICARBONATE PRN MLS/HR: 84 INJECTION INTRAVENOUS at 21:45

## 2020-09-18 RX ADMIN — EPINEPHRINE PRN MLS/HR: 1 INJECTION, SOLUTION, CONCENTRATE INTRAVENOUS at 04:27

## 2020-09-18 RX ADMIN — SODIUM BICARBONATE PRN MLS/HR: 84 INJECTION INTRAVENOUS at 12:45

## 2020-09-18 RX ADMIN — DEXAMETHASONE SODIUM PHOSPHATE SCH MG: 10 INJECTION INTRAMUSCULAR; INTRAVENOUS at 10:25

## 2020-09-18 RX ADMIN — Medication SCH ML: at 13:21

## 2020-09-18 RX ADMIN — CALCIUM GLUCONATE SCH MLS/HR: 20 INJECTION, SOLUTION INTRAVENOUS at 13:21

## 2020-09-18 RX ADMIN — NOREPINEPHRINE BITARTRATE PRN MLS/HR: 1 INJECTION, SOLUTION, CONCENTRATE INTRAVENOUS at 07:02

## 2020-09-18 RX ADMIN — PIPERACILLIN AND TAZOBACTAM SCH MLS/HR: 2; .25 INJECTION, POWDER, LYOPHILIZED, FOR SOLUTION INTRAVENOUS; PARENTERAL at 06:07

## 2020-09-18 RX ADMIN — PIPERACILLIN AND TAZOBACTAM SCH MLS/HR: 2; .25 INJECTION, POWDER, LYOPHILIZED, FOR SOLUTION INTRAVENOUS; PARENTERAL at 22:06

## 2020-09-18 RX ADMIN — DEXTROSE PRN MLS/HR: 5 SOLUTION INTRAVENOUS at 22:08

## 2020-09-18 RX ADMIN — INSULIN HUMAN PRN MLS/HR: 100 INJECTION, SOLUTION PARENTERAL at 20:00

## 2020-09-18 RX ADMIN — NOREPINEPHRINE BITARTRATE PRN MLS/HR: 1 INJECTION, SOLUTION, CONCENTRATE INTRAVENOUS at 14:05

## 2020-09-18 RX ADMIN — EPINEPHRINE PRN MLS/HR: 1 INJECTION, SOLUTION, CONCENTRATE INTRAVENOUS at 06:06

## 2020-09-18 RX ADMIN — Medication SCH MG: at 10:25

## 2020-09-18 RX ADMIN — EPINEPHRINE PRN MLS/HR: 1 INJECTION, SOLUTION, CONCENTRATE INTRAVENOUS at 19:10

## 2020-09-18 RX ADMIN — Medication SCH: at 06:05

## 2020-09-18 RX ADMIN — INSULIN HUMAN PRN MLS/HR: 100 INJECTION, SOLUTION PARENTERAL at 09:08

## 2020-09-18 RX ADMIN — EPINEPHRINE PRN MLS/HR: 1 INJECTION, SOLUTION, CONCENTRATE INTRAVENOUS at 09:45

## 2020-09-18 RX ADMIN — EPINEPHRINE PRN MLS/HR: 1 INJECTION, SOLUTION, CONCENTRATE INTRAVENOUS at 07:46

## 2020-09-18 RX ADMIN — NOREPINEPHRINE BITARTRATE PRN MLS/HR: 1 INJECTION, SOLUTION, CONCENTRATE INTRAVENOUS at 00:00

## 2020-09-18 RX ADMIN — NOREPINEPHRINE BITARTRATE PRN MLS/HR: 1 INJECTION, SOLUTION, CONCENTRATE INTRAVENOUS at 17:48

## 2020-09-18 RX ADMIN — EPINEPHRINE PRN MLS/HR: 1 INJECTION, SOLUTION, CONCENTRATE INTRAVENOUS at 11:42

## 2020-09-18 RX ADMIN — INSULIN HUMAN SCH UNIT: 100 INJECTION, SOLUTION PARENTERAL at 07:33

## 2020-09-18 RX ADMIN — PIPERACILLIN AND TAZOBACTAM SCH MLS/HR: 2; .25 INJECTION, POWDER, LYOPHILIZED, FOR SOLUTION INTRAVENOUS; PARENTERAL at 00:59

## 2020-09-18 RX ADMIN — SODIUM BICARBONATE PRN MLS/HR: 84 INJECTION INTRAVENOUS at 06:05

## 2020-09-18 RX ADMIN — EPINEPHRINE PRN MLS/HR: 1 INJECTION, SOLUTION, CONCENTRATE INTRAVENOUS at 02:48

## 2020-09-18 RX ADMIN — AZITHROMYCIN MONOHYDRATE SCH MLS/HR: 500 INJECTION, POWDER, LYOPHILIZED, FOR SOLUTION INTRAVENOUS at 10:25

## 2020-09-18 RX ADMIN — NOREPINEPHRINE BITARTRATE PRN MLS/HR: 1 INJECTION, SOLUTION, CONCENTRATE INTRAVENOUS at 10:28

## 2020-09-18 NOTE — PROGRESS NOTE
Provider Note


Provider Note: 





Patient repeat ABG with PG improved to 7.1 and his PO2 resulted at 27. I suspect

the sample is venous as the patient is hypoxic with sat monitoring reading mid 

70's to low 80's., despite ventilatory efforts. The PCO2 was elevated and i did 

max his rate to 35. I considered proning the patient at this point but due to 

his unstable hemodynamic state I do not feel the patient would tolerate proning 

at this time. Patient is over breathing vent and appears very uncomfortable. I 

personally discussed with the patients brother and girlfriend that his prognosis

was very poor.

## 2020-09-18 NOTE — RADIOLOGY REPORT (SQ)
EXAM DESCRIPTION:  CHEST SINGLE VIEW



IMAGES COMPLETED DATE/TIME:  9/18/2020 6:55 am



REASON FOR STUDY:  intubated



COMPARISON:  AP view of the chest from 9/17/2020.



EXAM PARAMETERS:  NUMBER OF VIEWS: One view.

TECHNIQUE:  An AP view of the chest was obtained.

RADIATION DOSE: NA

LIMITATIONS: None.



FINDINGS:  LUNGS AND PLEURA: Unchanged appearance of the lungs and pleura.

MEDIASTINUM AND HILAR STRUCTURES: Stable mediastinal hilar contours.

HEART AND VASCULAR STRUCTURES: Stable cardiac silhouette.

BONES: No acute findings.

HARDWARE: The tip of the endotracheal tube projects 4.5 cm above the andres.  The tip of the left IJ 
central venous catheter projects within the SVC.  The tip of the enteric tube projects within the gas
tric antrum.

OTHER: No other finding.



IMPRESSION:  Tubes and lines as above.  Otherwise unchanged radiographic appearance of the chest.



TECHNICAL DOCUMENTATION:  JOB ID:  [id number]

 2011 [employer]- All Rights Reserved



Reading location - IP/workstation name: [email or web address]

## 2020-09-18 NOTE — RADIOLOGY REPORT (SQ)
CLINICAL HISTORY:  TLC PLACEMENT 



COMPARISON: 9/17/2020. 



TECHNIQUE: XR CHEST 1 VIEW 9/17/2020 10:38 PM CDT



FINDINGS: 



The heart is borderline in size. There is moderate airspace

disease throughout both lungs diffusely. There is no pleural

effusion. There is no pneumothorax. There are no acute osseous

findings. Endotracheal tube, nasogastric tube are unchanged. Left

IJ central line tip is in the lower SVC.



IMPRESSION: No pneumothorax following left IJ central line

placement.

## 2020-09-18 NOTE — OPERATIVE REPORT
Nonrecallable Operative Report


DATE OF SURGERY: 09/18/20


PREOPERATIVE DIAGNOSIS: Renal failure, hypotension COVID positive


POSTOPERATIVE DIAGNOSIS: Renal failure, hypotension, COVID positive


OPERATION: Try analysis catheter placement right femoral vein


SURGEON: DAT MEYER


ANESTHESIA: Local


TISSUE REMOVED OR ALTERED: None


COMPLICATIONS: 





None


ESTIMATED BLOOD LOSS: 10 cc


INTRAOPERATIVE FINDINGS: See note


PROCEDURE: 





Procedure was accomplished at the patient's bedside in the intensive care unit. 

Patient is COVID positive.





After appropriate timeout site verification the right groin was prepped and 

draped in usual sterile fashion.





The right femoral vein was cannulated with a 16-gauge needle a wire was placed 

through the needle into the inferior vena cava.





Serial dilators were then used over the wire after a small skin incision was 

made with an 11 blade.





Once the tract was dilated the trial assist catheter was placed over the wire 

into the femoral vein up through the iliac vein into the inferior vena cava.





The catheter flushed well and withdrew well.  He was then heparinized with a 

heparinized saline solution.





It was fixed to the skin with 3-0 nylon suture.  A sterile dressing was applied 

which completed the procedure.





Estimated blood loss less than 10 cc.  Patient tolerated the procedure well.

## 2020-09-18 NOTE — PDOC CRITICAL CARE PROG REPORT
General


Date:: 09/18/20


ICU Day:: 2


Ventilator Day:: 2


Hospital Day:: 7


Resuscitation Status: Full Code


Events in the past 12 to 24 Hours:: 





Cardiac arrest with need for epinephrine drip.


Review of systems relevant to events:: 





Neurological, pulmonary, CV, renal.


Reason for ICU Addmission:: Needs intubation for covid PNA





- Medications:


Medications reviewed and adjusted accordingly: Yes


Vasopressors:: 





Epinephrine.


Sedation:: 





None.





Physical Exam


Vital Signs: 


                                        











Temp Pulse Resp BP Pulse Ox


 


 98.2 F   99   20   103/34 L  87 L


 


 09/18/20 08:09  09/18/20 08:00  09/18/20 08:00  09/18/20 08:00  09/18/20 08:00








                                 Intake & Output











 09/17/20 09/18/20 09/19/20





 06:59 06:59 06:59


 


Intake Total 762 3725 502


 


Output Total 553 592 2


 


Balance 209 3133 500


 


Weight 78.8 kg 85.8 kg 








                                  Weight/Height





Weight                           85.8 kg


Height                           5 ft 2 in








General appearance: PRESENT: no acute distress, obese


Head exam: PRESENT: atraumatic, normocephalic


Eye exam: PRESENT: conjunctiva pink, EOMI, PERRLA.  ABSENT: scleral icterus


Ear exam: PRESENT: normal external ear exam


Mouth exam: PRESENT: moist, tongue midline


Respiratory exam: PRESENT: clear to auscultation craig, decreased breath sounds.  

ABSENT: rales, rhonchi, wheezes


Cardiovascular exam: PRESENT: RRR, tachycardia.  ABSENT: diastolic murmur, rubs,

systolic murmur


GI/Abdominal exam: PRESENT: normal bowel sounds, soft.  ABSENT: distended, 

guarding, mass, organolmegaly, rebound, tenderness


Rectal exam: PRESENT: deferred


Extremities exam: PRESENT: full ROM.  ABSENT: calf tenderness, clubbing, pedal 

edema


Musculoskeletal exam: PRESENT: normal inspection


Neurological exam: PRESENT: other - He is not on sedation and has no 

neurological purposeful movements.


Skin exam: PRESENT: dry, intact, warm.  ABSENT: cyanosis, rash


Tubes/Lines: PRESENT: Endotracheal Tube, Central Line, Nasogastic Tube





Laboratory/Radiographs


Laboratory Results: 


                                        





                                 09/18/20 06:50 





                                 09/18/20 06:23 





                                        











  09/17/20 09/17/20 09/17/20





  17:40 17:45 21:47


 


WBC   


 


RBC   


 


Hgb   


 


Hct   


 


MCV   


 


MCH   


 


MCHC   


 


RDW   


 


Plt Count   


 


Seg Neutrophils %   


 


Carbonic Acid  1.57 H   1.78 H


 


HCO3/H2CO3 Ratio  5:1   5:1


 


ABG pH  6.84 L*   6.86 L*


 


ABG pCO2  52.0 H   59.3 H


 


ABG pO2  68.1 L   84.3


 


ABG HCO3  8.7 L   10.4 L


 


ABG O2 Saturation  74.7 L   85.4 L


 


ABG Base Excess  -25.4   -23.3


 


FiO2  100%   100%


 


Sodium   142.0 


 


Potassium   5.3 H 


 


Chloride   104 


 


Carbon Dioxide   10 L* D 


 


Anion Gap   28 H 


 


BUN   70 H 


 


Creatinine   4.04 H 


 


Est GFR ( Amer)   19 L 


 


Glucose   192 H 


 


Lactic Acid   


 


Calcium   7.2 L 


 


Magnesium   


 


Total Bilirubin   


 


AST   


 


Alkaline Phosphatase   


 


Total Protein   


 


Albumin   














  09/17/20 09/18/20 09/18/20





  23:53 01:18 04:53


 


WBC    Cancelled


 


RBC    Cancelled


 


Hgb    Cancelled


 


Hct    Cancelled


 


MCV    Cancelled


 


MCH    Cancelled


 


MCHC    Cancelled


 


RDW    Cancelled


 


Plt Count    Cancelled


 


Seg Neutrophils %    Cancelled


 


Carbonic Acid   1.87 H 


 


HCO3/H2CO3 Ratio   5:1 


 


ABG pH   6.87 L* 


 


ABG pCO2   62.1 H 


 


ABG pO2   74.2 L 


 


ABG HCO3   11.1 L 


 


ABG O2 Saturation   80.2 L 


 


ABG Base Excess   -22.4 


 


FiO2   100 


 


Sodium  142.1  


 


Potassium  5.1 H  


 


Chloride  101  


 


Carbon Dioxide  12 L  


 


Anion Gap  29 H  


 


BUN  66 H  


 


Creatinine  4.89 H  


 


Est GFR ( Amer)  15 L  


 


Glucose  220 H  


 


Lactic Acid   


 


Calcium  6.0 L*  


 


Magnesium  4.0 H  


 


Total Bilirubin  4.1 H  


 


AST  1260 H  


 


Alkaline Phosphatase  303 H  


 


Total Protein  5.9 L  


 


Albumin  2.0 L  














  09/18/20 09/18/20 09/18/20





  04:53 04:53 06:23


 


WBC   


 


RBC   


 


Hgb   


 


Hct   


 


MCV   


 


MCH   


 


MCHC   


 


RDW   


 


Plt Count   


 


Seg Neutrophils %   


 


Carbonic Acid  1.54 H  


 


HCO3/H2CO3 Ratio  6:1  


 


ABG pH  6.92 L*  


 


ABG pCO2  51.2 H  


 


ABG pO2  67.2 L  


 


ABG HCO3  10.1 L  


 


ABG O2 Saturation  77.7 L  


 


ABG Base Excess  -22.2  


 


FiO2  100  


 


Sodium    136.8 L


 


Potassium    4.6


 


Chloride    93 L


 


Carbon Dioxide    12 L


 


Anion Gap    32 H


 


BUN    66 H


 


Creatinine    5.54 H


 


Est GFR ( Amer)    13 L


 


Glucose    423 H*


 


Lactic Acid   22.6 H 


 


Calcium    5.6 L*


 


Magnesium   


 


Total Bilirubin   


 


AST   


 


Alkaline Phosphatase   


 


Total Protein   


 


Albumin   














  09/18/20





  06:50


 


WBC  23.2 H


 


RBC  3.09 L


 


Hgb  10.6 L D


 


Hct  34.6 L


 


MCV  112 H D


 


MCH  34.2 H


 


MCHC  30.6 L


 


RDW  18.1 H


 


Plt Count  120 L


 


Seg Neutrophils %  Not Reportable


 


Carbonic Acid 


 


HCO3/H2CO3 Ratio 


 


ABG pH 


 


ABG pCO2 


 


ABG pO2 


 


ABG HCO3 


 


ABG O2 Saturation 


 


ABG Base Excess 


 


FiO2 


 


Sodium 


 


Potassium 


 


Chloride 


 


Carbon Dioxide 


 


Anion Gap 


 


BUN 


 


Creatinine 


 


Est GFR (African Amer) 


 


Glucose 


 


Lactic Acid 


 


Calcium 


 


Magnesium 


 


Total Bilirubin 


 


AST 


 


Alkaline Phosphatase 


 


Total Protein 


 


Albumin 








                                        











  09/11/20 09/11/20





  08:23 08:23


 


Creatine Kinase  165 


 


CK-MB (CK-2)   1.07


 


Troponin I   < 0.012











Impressions: 


                                        





Abdomen Ultrasound  09/11/20 09:16


IMPRESSION:  No evidence of cholelithiasis or cholecystitis.  Nonvisualization 

of the pancreas and abdominal aorta due to obscuration by intervening bowel gas.

 Otherwise unremarkable examination.


 








Chest/Abdomen CTA  09/11/20 11:25


IMPRESSION:  1.  No evidence for acute pulmonary emboli.


2.  Extensive bilateral multifocal peripheral and parenchymal ground-glass 

opacities/consolidation and areas of consolidation in the lower lobes 

bilaterally.  Commonly reported imaging features of COVID-19 pneumonia are 

present.


3.  Mediastinal and right hilar lymphadenopathy.


3.  Additional findings as above.


 








Abdomen/Pelvis CT  09/11/20 11:26


IMPRESSION:  1.  Hepatic steatosis.


2.  Prominence of the gallbladder wall.  No gallstones.


3.  Small uncomplicated inguinal hernias.


 








KUB X-Ray  09/16/20 10:43


IMPRESSION:  NG tube is in place.  Gas pattern is nonspecific.


 











All labs, radiographs, diagnostic studies and EKGs were personally reviewed: Yes


In addition, reports of radiographic and diagnostic studies were read: Yes





Assessment and Plan





- Diagnosis


(1) Acute respiratory failure due to COVID-19


Is this a current diagnosis for this admission?: Yes   


Plan: 


Causing significant problems including yesterday"s arrest. Although he is young 

his prognosis is quite poor.








(2) Hypoxia


Is this a current diagnosis for this admission?: Yes   


Plan: 


Even on 100% FiO2 his saturation is 87-90%








(3) Obesity (BMI 30.0-34.9)


Is this a current diagnosis for this admission?: Yes   


Plan: 


Chronic








(4) ARF (acute renal failure)


Qualifiers: 


   Acute renal failure type: with acute tubular necrosis   Qualified Code(s): 

N17.0 - Acute kidney failure with tubular necrosis   


Is this a current diagnosis for this admission?: Yes   


Plan: 


His GFR, BUN/Cr are worse. He is acidotic from lactate and renal issues. He 

needs HD however he may not be stable enough, CRRT not done here. He is quite 

unstable for transfer.





Plan Summary: 





Will speak with Dr. Wren about attempting HD.





Critical Time


Critical Time (minutes): 40


Level of Care: ICU


Anticipated discharge: Other


Anticipated DC Timeframe: Other


-: 


1.  The care of a critical patient is a dynamic process.  This note is a 

representative synopsis but static in nature.  The timeframe for treatments 

given in order is not necessarily the actual time these treatments may have been

done.





2.  This patient requires critical care secondary to ongoing requirements for 

therapy not offered or safe outside the critical care environment.  Transfer to 

a lower level of care will result in altered life or limb morbidity and mortal

ity.





3.  Multidisciplinary rounds completed.





4.  ABCDE bundle addressed.

## 2020-09-18 NOTE — OPERATIVE REPORT
Bedside Procedure





- History of Present Illness


Indication for Procedure: cardiac arrest requiring high doses epi


Provider: NISHI DOMINGUEZ





- Central Line


  ** Left Internal jugular


Time completed: 22:15


Consent obtained: Yes - discussed with patient brother and he verbally gave 

consent for placement


Central line pre-insertion: Sterile PPE donned, Chloraprep applied, Sterile 

drapes applied


Central line size (Fr.): 7


Central line lumen type: Triple


Anesthetic type: 1% Lidocaine


mL's of anesthesia: 2


Ultrasound guided: Yes


Line secured with sutures: Yes


Central line post-insertion: Blood return from lumens, Biopatch applied, Sterile

dressing applied, Position confirmed w/ CXR


Number of attempts: 2 - placed vas cath on right IJ very positional pulled vas 

cath over wire attempted to place TLC over wire without success. Right IJ site 

aborted pressure applied and place TLC on left.


Complications: No

## 2020-09-19 VITALS — DIASTOLIC BLOOD PRESSURE: 27 MMHG | SYSTOLIC BLOOD PRESSURE: 39 MMHG

## 2020-09-19 LAB
ABSOLUTE LYMPHOCYTES# (MANUAL): 1.5 10^3/UL (ref 0.5–4.7)
ABSOLUTE MONOCYTES # (MANUAL): 0.9 10^3/UL (ref 0.1–1.4)
ADD MANUAL DIFF: YES
ALBUMIN SERPL-MCNC: 2 G/DL (ref 3.5–5)
ALBUMIN SERPL-MCNC: 2.2 G/DL (ref 3.5–5)
ALP SERPL-CCNC: 829 U/L (ref 38–126)
ALP SERPL-CCNC: 973 U/L (ref 38–126)
ANION GAP SERPL CALC-SCNC: 25 MMOL/L (ref 5–19)
ANION GAP SERPL CALC-SCNC: 31 MMOL/L (ref 5–19)
ANISOCYTOSIS BLD QL SMEAR: (no result)
ARTERIAL BLOOD FIO2: (no result)
ARTERIAL BLOOD FIO2: 100
ARTERIAL BLOOD H2CO3: 1.66 MMOL/L (ref 1.05–1.35)
ARTERIAL BLOOD H2CO3: 1.67 MMOL/L (ref 1.05–1.35)
ARTERIAL BLOOD HCO3: 13.1 MMOL/L (ref 20–24)
ARTERIAL BLOOD HCO3: 13.1 MMOL/L (ref 20–24)
ARTERIAL BLOOD PCO2: 55.3 MMHG (ref 35–45)
ARTERIAL BLOOD PCO2: 55.4 MMHG (ref 35–45)
ARTERIAL BLOOD PH: 6.99 (ref 7.35–7.45)
ARTERIAL BLOOD PH: 6.99 (ref 7.35–7.45)
ARTERIAL BLOOD PO2: 28.2 MMHG (ref 80–100)
ARTERIAL BLOOD TOTAL CO2: 14.8 MMOL/L (ref 23–27)
ARTERIAL BLOOD TOTAL CO2: 14.8 MMOL/L (ref 23–27)
AST SERPL-CCNC: (no result) U/L (ref 17–59)
AST SERPL-CCNC: (no result) U/L (ref 17–59)
BASE EXCESS BLDA CALC-SCNC: -17.3 MMOL/L
BASE EXCESS BLDA CALC-SCNC: -18.6 MMOL/L
BASOPHILS NFR BLD MANUAL: 0 % (ref 0–2)
BILIRUB DIRECT SERPL-MCNC: 5.7 MG/DL (ref 0–0.4)
BILIRUB DIRECT SERPL-MCNC: 5.7 MG/DL (ref 0–0.4)
BILIRUB SERPL-MCNC: 7.1 MG/DL (ref 0.2–1.3)
BILIRUB SERPL-MCNC: 7.2 MG/DL (ref 0.2–1.3)
BUN SERPL-MCNC: 36 MG/DL (ref 7–20)
BUN SERPL-MCNC: 39 MG/DL (ref 7–20)
CALCIUM: 5.2 MG/DL (ref 8.4–10.2)
CALCIUM: 5.4 MG/DL (ref 8.4–10.2)
CHLORIDE SERPL-SCNC: 87 MMOL/L (ref 98–107)
CHLORIDE SERPL-SCNC: 90 MMOL/L (ref 98–107)
CO2 SERPL-SCNC: 13 MMOL/L (ref 22–30)
CO2 SERPL-SCNC: 16 MMOL/L (ref 22–30)
EOSINOPHIL NFR BLD MANUAL: 0 % (ref 0–6)
ERYTHROCYTE [DISTWIDTH] IN BLOOD BY AUTOMATED COUNT: 17.2 % (ref 11.5–14)
GLUCOSE SERPL-MCNC: 123 MG/DL (ref 75–110)
GLUCOSE SERPL-MCNC: 336 MG/DL (ref 75–110)
HCT VFR BLD CALC: 21.6 % (ref 37.9–51)
HGB BLD-MCNC: 7 G/DL (ref 13.5–17)
MACROCYTES BLD QL SMEAR: (no result)
MCH RBC QN AUTO: 35.9 PG (ref 27–33.4)
MCHC RBC AUTO-ENTMCNC: 32.6 G/DL (ref 32–36)
MCV RBC AUTO: 110 FL (ref 80–97)
MONOCYTES % (MANUAL): 8 % (ref 3–13)
NEUTS BAND NFR BLD MANUAL: 2 % (ref 3–5)
PLATELET # BLD: 32 10^3/UL (ref 150–450)
PLATELET COMMENT: (no result)
POTASSIUM SERPL-SCNC: 4.7 MMOL/L (ref 3.6–5)
POTASSIUM SERPL-SCNC: 5.4 MMOL/L (ref 3.6–5)
PROT SERPL-MCNC: 4.9 G/DL (ref 6.3–8.2)
PROT SERPL-MCNC: 5.5 G/DL (ref 6.3–8.2)
RBC # BLD AUTO: 1.96 10^6/UL (ref 4.35–5.55)
SAO2 % BLDA: 29.6 % (ref 94–98)
SAO2 % BLDA: 29.8 % (ref 94–98)
SEGMENTED NEUTROPHILS % (MAN): 77 % (ref 42–78)
TOTAL CELLS COUNTED BLD: 100
TRIGL SERPL-MCNC: 152 MG/DL (ref ?–150)
VARIANT LYMPHS NFR BLD MANUAL: 13 % (ref 13–45)
WBC # BLD AUTO: 11.2 10^3/UL (ref 4–10.5)

## 2020-09-19 RX ADMIN — NOREPINEPHRINE BITARTRATE PRN MLS/HR: 1 INJECTION, SOLUTION, CONCENTRATE INTRAVENOUS at 09:40

## 2020-09-19 RX ADMIN — DEXTROSE PRN MLS/HR: 5 SOLUTION INTRAVENOUS at 08:21

## 2020-09-19 RX ADMIN — EPINEPHRINE PRN MLS/HR: 1 INJECTION, SOLUTION, CONCENTRATE INTRAVENOUS at 06:20

## 2020-09-19 RX ADMIN — PIPERACILLIN AND TAZOBACTAM SCH MLS/HR: 2; .25 INJECTION, POWDER, LYOPHILIZED, FOR SOLUTION INTRAVENOUS; PARENTERAL at 05:00

## 2020-09-19 RX ADMIN — NOREPINEPHRINE BITARTRATE PRN MLS/HR: 1 INJECTION, SOLUTION, CONCENTRATE INTRAVENOUS at 04:43

## 2020-09-19 RX ADMIN — DEXTROSE PRN MLS/HR: 5 SOLUTION INTRAVENOUS at 04:10

## 2020-09-19 RX ADMIN — Medication SCH: at 05:01

## 2020-09-19 RX ADMIN — NOREPINEPHRINE BITARTRATE PRN MLS/HR: 1 INJECTION, SOLUTION, CONCENTRATE INTRAVENOUS at 00:06

## 2020-09-19 RX ADMIN — EPINEPHRINE PRN MLS/HR: 1 INJECTION, SOLUTION, CONCENTRATE INTRAVENOUS at 02:40

## 2020-09-19 RX ADMIN — CALCIUM GLUCONATE SCH MLS/HR: 20 INJECTION, SOLUTION INTRAVENOUS at 03:05

## 2020-09-19 RX ADMIN — PANTOPRAZOLE SODIUM SCH: 40 INJECTION, POWDER, FOR SOLUTION INTRAVENOUS at 14:08

## 2020-09-19 RX ADMIN — SODIUM BICARBONATE PRN MLS/HR: 84 INJECTION INTRAVENOUS at 00:04

## 2020-09-19 RX ADMIN — DEXTROSE PRN MLS/HR: 5 SOLUTION INTRAVENOUS at 00:06

## 2020-09-19 RX ADMIN — Medication SCH: at 14:09

## 2020-09-19 RX ADMIN — DEXAMETHASONE SODIUM PHOSPHATE SCH: 10 INJECTION INTRAMUSCULAR; INTRAVENOUS at 14:08

## 2020-09-19 RX ADMIN — ALBUMIN (HUMAN) SCH MLS/HR: 12.5 SOLUTION INTRAVENOUS at 03:04

## 2020-09-19 RX ADMIN — ALBUMIN (HUMAN) SCH MLS/HR: 12.5 SOLUTION INTRAVENOUS at 02:16

## 2020-09-19 RX ADMIN — SODIUM BICARBONATE PRN MLS/HR: 84 INJECTION INTRAVENOUS at 07:30

## 2020-09-19 RX ADMIN — CALCIUM GLUCONATE SCH MLS/HR: 20 INJECTION, SOLUTION INTRAVENOUS at 02:16

## 2020-09-19 RX ADMIN — VITAMIN D, TAB 1000IU (100/BT) SCH: 25 TAB at 14:08

## 2020-09-19 RX ADMIN — INSULIN HUMAN PRN MLS/HR: 100 INJECTION, SOLUTION PARENTERAL at 00:26

## 2020-09-19 NOTE — PDOC CRITICAL CARE PROG REPORT
General


Date:: 09/19/20


ICU Day:: 3


Ventilator Day:: 3


Hospital Day:: 8


Resuscitation Status: Full Code


Events in the past 12 to 24 Hours:: 





Oxygenation worse despite maximal efforts. Neuro response simply overbreathing v

ent.


Review of systems relevant to events:: 





Pulmonary. CV. Neurologic.


Reason for ICU Addmission:: Needs intubation for covid PNA





- Medications:


Medications reviewed and adjusted accordingly: Yes


Vasopressors:: 





Vasopressin, levophed, epinephrine.


Sedation:: 





None





Physical Exam


Vital Signs: 


                                        











Temp Pulse Resp BP Pulse Ox


 


 93.2 F L  88   35 H  126/58 H  30 L


 


 09/19/20 07:41  09/19/20 07:41  09/19/20 07:41  09/19/20 07:41  09/19/20 07:41








                                 Intake & Output











 09/18/20 09/19/20 09/20/20





 06:59 06:59 06:59


 


Intake Total 3725 8574 


 


Output Total 592 2 


 


Balance 3133 8572 


 


Weight 85.8 kg 96.9 kg 








                                  Weight/Height





Weight                           96.9 kg


Height                           5 ft 2 in








General appearance: PRESENT: no acute distress, obese


Head exam: PRESENT: atraumatic, normocephalic


Eye exam: PRESENT: conjunctiva pink, EOMI, PERRLA.  ABSENT: scleral icterus


Ear exam: PRESENT: bleeding


Mouth exam: PRESENT: moist, tongue midline


Respiratory exam: PRESENT: crackles - Dry sounding C/W covid., decreased breath 

sounds, symmetrical, unlabored


Cardiovascular exam: PRESENT: RRR.  ABSENT: diastolic murmur, rubs, systolic 

murmur


Rectal exam: PRESENT: deferred


Gentrourinary exam: PRESENT: indwelling catheter


Extremities exam: PRESENT: +1 edema


Musculoskeletal exam: PRESENT: normal inspection


Neurological exam: PRESENT: other - At this point he is on no sedation. He is 

overbreathing the ventilator. He has no response to pain, voice, not moving. GCS

3.


Skin exam: PRESENT: dry, intact, warm.  ABSENT: cyanosis, rash


Tubes/Lines: PRESENT: Endotracheal Tube, Central Line, Dialysis catheter, 

Nasogastic Tube





Laboratory/Radiographs


Laboratory Results: 


                                        





                                 09/19/20 05:20 





                                 09/19/20 05:20 





                                        











  09/18/20 09/18/20 09/18/20





  12:00 13:57 15:50


 


WBC   


 


RBC   


 


Hgb   


 


Hct   


 


MCV   


 


MCH   


 


MCHC   


 


RDW   


 


Plt Count   


 


Seg Neutrophils %   


 


Carbonic Acid   


 


HCO3/H2CO3 Ratio   


 


ABG pH   


 


ABG pCO2   


 


ABG pO2   


 


ABG HCO3   


 


ABG O2 Saturation   


 


ABG Base Excess   


 


FiO2   


 


Sodium  130.3 L  


 


Potassium  4.5  


 


Chloride  86 L  


 


Carbon Dioxide  16 L  


 


Anion Gap  28 H  


 


BUN  66 H  


 


Creatinine  5.69 H  


 


Est GFR ( Amer)  13 L  


 


Est GFR (Non-Af Amer)   


 


Glucose  544 H*  562 H*  568 H*


 


Calcium  5.1 L*  


 


Ionized Calcium Mckenna   


 


Total Bilirubin  5.5 H  


 


AST  5828 H  


 


Alkaline Phosphatase  510 H  


 


Total Protein  5.1 L  


 


Albumin  1.8 L  


 


Triglycerides   














  09/18/20 09/18/20 09/18/20





  18:12 18:30 20:41


 


WBC   


 


RBC   


 


Hgb   


 


Hct   


 


MCV   


 


MCH   


 


MCHC   


 


RDW   


 


Plt Count   


 


Seg Neutrophils %   


 


Carbonic Acid    1.93 H


 


HCO3/H2CO3 Ratio    10:1


 


ABG pH    7.12 L*


 


ABG pCO2    64.1 H


 


ABG pO2    27.9 L*


 


ABG HCO3    20.3


 


ABG O2 Saturation    35.0 L


 


ABG Base Excess    -9.5


 


FiO2    100%


 


Sodium  Cancelled  133.1 L 


 


Potassium  Cancelled  4.0 


 


Chloride  Cancelled  90 L 


 


Carbon Dioxide  Cancelled  24 


 


Anion Gap  Cancelled  19 


 


BUN  Cancelled  38 H D 


 


Creatinine  Cancelled  3.36 H 


 


Est GFR ( Amer)  Cancelled  24 L 


 


Est GFR (Non-Af Amer)  Cancelled  


 


Glucose  Cancelled  312 H 


 


Calcium  Cancelled  6.6 L* 


 


Ionized Calcium Mckenna   


 


Total Bilirubin  Cancelled  8.4 H D 


 


AST  Cancelled  14367 H 


 


Alkaline Phosphatase  Cancelled  1493 H 


 


Total Protein  Cancelled  7.3 


 


Albumin  Cancelled  2.7 L 


 


Triglycerides   














  09/19/20 09/19/20 09/19/20





  00:23 02:32 05:20


 


WBC   


 


RBC   


 


Hgb   


 


Hct   


 


MCV   


 


MCH   


 


MCHC   


 


RDW   


 


Plt Count   


 


Seg Neutrophils %   


 


Carbonic Acid   1.66 H 


 


HCO3/H2CO3 Ratio   7:1 


 


ABG pH   6.99 L* 


 


ABG pCO2   55.3 H 


 


ABG pO2   28.2 L* 


 


ABG HCO3   13.1 L 


 


ABG O2 Saturation   29.8 L 


 


ABG Base Excess   -18.6 


 


FiO2   100% 


 


Sodium  127.6 L   133.7 L


 


Potassium  4.7   5.4 H


 


Chloride  87 L   90 L


 


Carbon Dioxide  16 L   13 L


 


Anion Gap  25 H   31 H


 


BUN  39 H   36 H


 


Creatinine  3.74 H   4.10 H


 


Est GFR ( Amer)  21 L   19 L


 


Est GFR (Non-Af Amer)   


 


Glucose  336 H   123 H


 


Calcium  5.2 L*   5.4 L*


 


Ionized Calcium Mckenna   


 


Total Bilirubin  7.1 H   7.2 H


 


AST  23909 H   00310 H


 


Alkaline Phosphatase  973 H   829 H


 


Total Protein  5.5 L   4.9 L


 


Albumin  2.2 L   2.0 L


 


Triglycerides    152 H














  09/19/20 09/19/20





  05:20 05:20


 


WBC   11.2 H


 


RBC   1.96 L


 


Hgb   7.0 L D


 


Hct   21.6 L


 


MCV   110 H


 


MCH   35.9 H


 


MCHC   32.6


 


RDW   17.2 H


 


Plt Count   32 L


 


Seg Neutrophils %   Not Reportable


 


Carbonic Acid  1.67 H 


 


HCO3/H2CO3 Ratio  7:1 


 


ABG pH  6.99 L* 


 


ABG pCO2  55.4 H 


 


ABG pO2  28.3 L* 


 


ABG HCO3  13.1 L 


 


ABG O2 Saturation  29.6 L 


 


ABG Base Excess  -17.3 


 


FiO2  100 


 


Sodium  


 


Potassium  


 


Chloride  


 


Carbon Dioxide  


 


Anion Gap  


 


BUN  


 


Creatinine  


 


Est GFR ( Amer)  


 


Est GFR (Non-Af Amer)  


 


Glucose  


 


Calcium  


 


Ionized Calcium Mckenna  0.71 L 


 


Total Bilirubin  


 


AST  


 


Alkaline Phosphatase  


 


Total Protein  


 


Albumin  


 


Triglycerides  








                                        











  09/11/20 09/11/20





  08:23 08:23


 


Creatine Kinase  165 


 


CK-MB (CK-2)   1.07


 


Troponin I   < 0.012











Impressions: 


                                        





Abdomen Ultrasound  09/11/20 09:16


IMPRESSION:  No evidence of cholelithiasis or cholecystitis.  Nonvisualization 

of the pancreas and abdominal aorta due to obscuration by intervening bowel gas.

 Otherwise unremarkable examination.


 








Chest/Abdomen CTA  09/11/20 11:25


IMPRESSION:  1.  No evidence for acute pulmonary emboli.


2.  Extensive bilateral multifocal peripheral and parenchymal ground-glass 

opacities/consolidation and areas of consolidation in the lower lobes bilater

ally.  Commonly reported imaging features of COVID-19 pneumonia are present.


3.  Mediastinal and right hilar lymphadenopathy.


3.  Additional findings as above.


 








Abdomen/Pelvis CT  09/11/20 11:26


IMPRESSION:  1.  Hepatic steatosis.


2.  Prominence of the gallbladder wall.  No gallstones.


3.  Small uncomplicated inguinal hernias.


 








KUB X-Ray  09/16/20 10:43


IMPRESSION:  NG tube is in place.  Gas pattern is nonspecific.


 











All labs, radiographs, diagnostic studies and EKGs were personally reviewed: Yes


In addition, reports of radiographic and diagnostic studies were read: Yes





Assessment and Plan





- Diagnosis


(1) Acute respiratory failure due to COVID-19


Is this a current diagnosis for this admission?: Yes   


Plan: 


He is irreversably hypoxic despite high PEEP and 100% FiO2. He is likely not 

going to survive. I do not expect him to live beyond today. Girlfriend and 

brother are aware and girlfriend will be visiting soon.








(2) Hypoxia


Is this a current diagnosis for this admission?: Yes   


Plan: 


O2 saturations have fluctuated between 30 and 60%.








(3) Obesity (BMI 30.0-34.9)


Is this a current diagnosis for this admission?: Yes   


Plan: 


With edema fluid his weight is somewhat artificially higher. He was somewhat 

obese on admission.








(4) ARF (acute renal failure)


Qualifiers: 


   Acute renal failure type: with acute tubular necrosis   Qualified Code(s): 

N17.0 - Acute kidney failure with tubular necrosis   


Is this a current diagnosis for this admission?: Yes   


Plan: 


Tolerated HD but required higher pressor dose.





Plan Summary: 





Unfortunately there is no effective therapy for this degree of hypoxia. Too 

unstable to consider transfer for ECMO.





Critical Time


Critical Time (minutes): 40


Level of Care: ICU


Anticipated discharge: Other


Anticipated DC Timeframe: Other


-: 


1.  The care of a critical patient is a dynamic process.  This note is a 

representative synopsis but static in nature.  The timeframe for treatments 

given in order is not necessarily the actual time these treatments may have been

done.





2.  This patient requires critical care secondary to ongoing requirements for 

therapy not offered or safe outside the critical care environment.  Transfer to 

a lower level of care will result in altered life or limb morbidity and 

mortality.





3.  Multidisciplinary rounds completed.





4.  ABCDE bundle addressed.

## 2020-09-19 NOTE — RADIOLOGY REPORT (SQ)
EXAM DESCRIPTION:  CHEST SINGLE VIEW



IMAGES COMPLETED DATE/TIME:  9/19/2020 5:44 am



REASON FOR STUDY:  intubated



COMPARISON:  Chest radiograph 9/18/2020



NUMBER OF VIEWS:  One view.



TECHNIQUE:  Single frontal radiographic view of the chest acquired.



LIMITATIONS:  None.



FINDINGS:  LUNGS AND PLEURA: Similar diffuse hazy opacities bilaterally, slightly more dense in the r
etrocardiac left lower lobe.

MEDIASTINUM AND HILAR STRUCTURES: No masses.  Contour normal.

HEART AND VASCULAR STRUCTURES: Heart normal in size.  Normal vasculature.

BONES: No acute findings.

HARDWARE: Left internal jugular central venous catheter, enteric tube, and endotracheal tube are unch
anged.

OTHER: No other significant finding.



IMPRESSION:  No significant interval change in the appearance of the chest.



TECHNICAL DOCUMENTATION:  JOB ID:  [id number]

 2011 [employer]- All Rights Reserved



Reading location - IP/workstation name: [email or web address]

## 2020-09-19 NOTE — DEATH SUMMARY
Death Summary


Date : 20


Time of Death:: 10:47


Autopsy: No


Resuscitation Status: Full Code


Consulting Provider: Vince Cadet MD





- Final Diagnosis


(1) Acute respiratory failure due to COVID-19


Is this a current diagnosis for this admission?: Yes   





(2) Hypoxia


Is this a current diagnosis for this admission?: Yes   





(3) Obesity (BMI 30.0-34.9)


Is this a current diagnosis for this admission?: Yes   





(4) ARF (acute renal failure)


Is this a current diagnosis for this admission?: Yes   


Hospital Course:: 





This patient was a 49 yo man admitted to the medical service and found to be 

COVID-19 positive. His admitting complaints were largely respiratory. He 

progressed from a non-rebreather mask to bipap eventually bipap at 100 % before 

the ICU. Prior to physical transfer the patient had some anxiety and was 

emergantly intubated on the [facility] floor. He then spent 3 days in the ICU on an 

unstoppable downward coarse. He had ARF with a need for HD. A pressor 

requirement that included epinephrine, neosynephrine, vasopressin. He had 2 

cardiac arrests before his final arrest and never regained neurologic recovery. 

He finally became bradycardic ti 28 and was in arrest on the morning of . 

CPR was again instituted with ACLS protocol followed with 4 rounds of 

epinephrine given. Despite this asystole was present throughout and confirmed in

2 leads. No pulse felt and the patient was pronounced dead at 1047 AM. The 

family has been attentive and present throughout this ordeal.

## 2020-09-20 LAB — HEPATITIS C VIRUS ANTIBODY: 0.2 S/CO RATIO (ref 0–0.9)

## 2020-09-20 NOTE — PDOC PROGRESS REPORT
Subjective


Progress Note for:: 09/18/20


Reason For Visit: 


Is late note for patient seen on the 18th.  He was seen in the ICU on dialysis. 

Patient looks extremely sick and moribund.  Blood pressure is rather tenuous in 

spite of being on pressors.  Labs and medications were reviewed.  Dialysis 

orders were reviewed with the treating dialysis nurse.





Physical Exam


Vital Signs: 


                                        











Temp Pulse Resp BP Pulse Ox


 


 93.2 F L  88   11 L  39/27 L  24 L


 


 09/19/20 07:41  09/19/20 07:41  09/19/20 10:39  09/19/20 10:40  09/19/20 10:32








                                 Intake & Output











 09/19/20 09/20/20 09/21/20





 06:59 06:59 06:59


 


Intake Total 9574 1508 


 


Output Total 2 0 


 


Balance 9572 1508 


 


Weight 96.9 kg  











Exam: 


Patient looks extremely sick and moribund.  He is intubated and sedated.





Respiratory exam: PRESENT: clear to auscultation craig.  ABSENT: crackles


Cardiovascular exam: PRESENT: +S1, +S2


GI/Abdominal exam: PRESENT: normal bowel sounds, soft.  ABSENT: organomegaly





Results


Laboratory Results: 


                                        





                                 09/19/20 05:20 





                                 09/19/20 05:20 





                                        











  09/11/20 09/11/20





  08:23 08:23


 


Creatine Kinase  165 


 


CK-MB (CK-2)   1.07


 


Troponin I   < 0.012











Impressions: 


                                        





Abdomen Ultrasound  09/11/20 09:16


IMPRESSION:  No evidence of cholelithiasis or cholecystitis.  Nonvisualization 

of the pancreas and abdominal aorta due to obscuration by intervening bowel gas.

 Otherwise unremarkable examination.


 








Chest/Abdomen CTA  09/11/20 11:25


IMPRESSION:  1.  No evidence for acute pulmonary emboli.


2.  Extensive bilateral multifocal peripheral and parenchymal ground-glass 

opacities/consolidation and areas of consolidation in the lower lobes 

bilaterally.  Commonly reported imaging features of COVID-19 pneumonia are 

present.


3.  Mediastinal and right hilar lymphadenopathy.


3.  Additional findings as above.


 








Abdomen/Pelvis CT  09/11/20 11:26


IMPRESSION:  1.  Hepatic steatosis.


2.  Prominence of the gallbladder wall.  No gallstones.


3.  Small uncomplicated inguinal hernias.


 








KUB X-Ray  09/16/20 10:43


IMPRESSION:  NG tube is in place.  Gas pattern is nonspecific.


 








Chest X-Ray  09/19/20 05:00


IMPRESSION:  No significant interval change in the appearance of the chest.


 














Assessment & Plan





- Diagnosis


(1) ARF (acute renal failure)


Qualifiers: 


   Acute renal failure type: with acute tubular necrosis   Qualified Code(s): 

N17.0 - Acute kidney failure with tubular necrosis   


Is this a current diagnosis for this admission?: Yes   


Plan: 


This is a patient with Covid pneumonia and respiratory failure now intubated and

sedated showing acute kidney insult with decreasing urine output along with 

hyperkalemia and acidosis.  Patient has had a temporary  catheter placed and is 

being dialyzed.  Looks like it is going to be difficult to dialyze him given his

very tenuous blood pressures.  Therefore will keep him positive.  Dialysis 

orders were reviewed with treating dialysis nurse..








(2) Acute respiratory failure due to COVID-19


Is this a current diagnosis for this admission?: Yes   


Plan: 


On appropriate medications and being managed by Dr. Escobedo/intensivist.








(3) Bilateral pneumonia


Qualifiers: 


   Pneumonia type: due to unspecified organism   Lung location: lower lobe of 

lung   Qualified Code(s): J18.9 - Pneumonia, unspecified organism   


Is this a current diagnosis for this admission?: Yes   


Plan: 


Secondary to covid pneumonia.








(4) Hyperkalemia


Plan: 


See response to HD.








(5) Obesity (BMI 30.0-34.9)


Is this a current diagnosis for this admission?: Yes

## 2020-09-21 LAB — ARTERIAL BLOOD PO2: 28.3 MMHG (ref 80–100)

## 2020-10-11 NOTE — RADIOLOGY REPORT (SQ)
EXAM DESCRIPTION:  CHEST SINGLE VIEW



IMAGES COMPLETED DATE/TIME:  9/11/2020 8:43 am



REASON FOR STUDY:  shortness of breath



COMPARISON:  None.



EXAM PARAMETERS:  NUMBER OF VIEWS: One view.

TECHNIQUE: Single frontal radiographic view of the chest acquired.

RADIATION DOSE: NA

LIMITATIONS: None.



FINDINGS:  LUNGS AND PLEURA: Multifocal airspace opacities.

MEDIASTINUM AND HILAR STRUCTURES: No masses.  Contour normal.

HEART AND VASCULAR STRUCTURES: Heart normal in size.  Normal vasculature.

BONES: No acute findings.

HARDWARE: None in the chest.

OTHER: No other significant finding.



IMPRESSION:  Multi lobar pneumonia.  Given distribution, recommend consideration for atypical etiolog
ies in treatment planning.



TECHNICAL DOCUMENTATION:  JOB ID:  [id number]

 2011 [employer]- All Rights Reserved



Reading location - IP/workstation name: [email or web address]